# Patient Record
Sex: FEMALE | Race: WHITE | Employment: UNEMPLOYED | URBAN - METROPOLITAN AREA
[De-identification: names, ages, dates, MRNs, and addresses within clinical notes are randomized per-mention and may not be internally consistent; named-entity substitution may affect disease eponyms.]

---

## 2017-05-12 ENCOUNTER — ED HISTORICAL/CONVERTED ENCOUNTER (OUTPATIENT)
Dept: OTHER | Age: 62
End: 2017-05-12

## 2017-05-23 ENCOUNTER — OFFICE VISIT (OUTPATIENT)
Dept: GYNECOLOGY | Age: 62
End: 2017-05-23

## 2017-05-23 VITALS
DIASTOLIC BLOOD PRESSURE: 92 MMHG | WEIGHT: 293 LBS | HEART RATE: 107 BPM | BODY MASS INDEX: 45.99 KG/M2 | HEIGHT: 67 IN | SYSTOLIC BLOOD PRESSURE: 178 MMHG

## 2017-05-23 DIAGNOSIS — N85.00 ENDOMETRIAL HYPERPLASIA: ICD-10-CM

## 2017-05-23 DIAGNOSIS — R19.00 PELVIC MASS: Primary | ICD-10-CM

## 2017-05-23 PROBLEM — E66.01 MORBID OBESITY WITH BMI OF 60.0-69.9, ADULT (HCC): Status: ACTIVE | Noted: 2017-05-23

## 2017-05-23 RX ORDER — METHOCARBAMOL 500 MG/1
TABLET, FILM COATED ORAL
Refills: 0 | COMMUNITY
Start: 2017-05-11 | End: 2017-06-12

## 2017-05-23 RX ORDER — PREGABALIN 75 MG/1
CAPSULE ORAL
Refills: 0 | COMMUNITY
Start: 2017-05-07 | End: 2017-06-12

## 2017-05-23 RX ORDER — ZOSTER VACCINE LIVE 19400 [PFU]/.65ML
INJECTION, POWDER, LYOPHILIZED, FOR SUSPENSION SUBCUTANEOUS
Refills: 0 | COMMUNITY
Start: 2017-05-11 | End: 2017-05-23 | Stop reason: ALTCHOICE

## 2017-05-23 RX ORDER — CYCLOBENZAPRINE HCL 10 MG
TABLET ORAL
Refills: 0 | COMMUNITY
Start: 2017-05-19

## 2017-05-23 NOTE — PROGRESS NOTES
[address] [place] [address] Mathias Mor[address], [address] [facility]  [phone]  MD Elías Jessica MD Natalie Davies, NP    Office Note  Patient ID:  Name:  Conor Richmond  MRN:  [id number]  :  1955/61 y.o. Date:  2017      HISTORY OF PRESENT ILLNESS:  Conor Richmond is a 63 y.o.  postmenopausal female who is being seen for a complex pelvic mass. She is referred by Dr. Bridger Henderson. She also has a history of endometrial hyperplasia and was previously on Megace. She never had any kind of test of cure. She stopped the Megace 2 years ago and has had no bleeding since. She was recently evaluated for a possible kidneys stone. Her CT scan demonstrated a complex, 8 cm, septated mass arising from the left adnexa and displacing the uterus to the right. There was also a large calcified uterine fibroid. There was no free fluid in the pelvis and noadenopathy. Her CA-125 was normal at 21. Her recent pap smear was also normal.  I have been asked to see her in consultation for further evaluation and management. ROS:   and GI review: Negative  Cardiopulmonary review:Negative   Musculoskeletal:  Negative    A comprehensive review of systems was negative except for that written in the History of Present Illness. , 10 point ROS    OB/GYN ROS:  [id number]  Patient denies any abnormal bleeding or vaginal discharge.        Problem List:  Patient Active Problem List    Diagnosis Date Noted    Pelvic mass 2017    Endometrial hyperplasia 2017    Morbid obesity with BMI of 60.0-69.9, adult ([address] 2017     PMH:  Past Medical History:   Diagnosis Date    Diabetes ([address]     diet controlled    Fibromyalgia     GERD (gastroesophageal reflux disease)     Hypercholesteremia     Hypertension     Osteoarthritis of both knees     Plantar fasciitis of left foot       PSH:  Past Surgical History:   Procedure Laterality Date    HX ORTHOPAEDIC      right knee arthroscopy      Social History:  Social History   Substance Use Topics    Smoking status: Never Smoker    Smokeless tobacco: Not on file    Alcohol use No      Family History:  Family History   Problem Relation Age of Onset    Heart Disease Father       Medications: (reviewed)  Current Outpatient Prescriptions   Medication Sig    cyclobenzaprine (FLEXERIL) 10 mg tablet take 1 tablet by mouth every evening    methocarbamol (ROBAXIN) 500 mg tablet take 1 tablet by mouth twice a day    LYRICA 75 mg capsule     traMADol (ULTRAM) 50 mg tablet Take 100 mg by mouth every six (6) hours as needed for Pain.  amLODIPine-Valsartan-HCTZ -25 mg tab Take  by mouth daily.  pravastatin (PRAVACHOL) 40 mg tablet Take 40 mg by mouth nightly.  aspirin 81 mg chewable tablet Take 81 mg by mouth daily.  MULTIVITS W-FE,OTHER MIN/LUT (CENTRUM SILVER ULTRA WOMEN'S PO) Take  by mouth.  cinnamon bark (CINNAMON) 500 mg cap Take 1,000 mg by mouth daily.  omega-3 fatty acids-vitamin e (FISH OIL) 1,000 mg cap Take 2 Caps by mouth daily.  cholecalciferol (VITAMIN D3) 1,000 unit tablet Take 1,000 Units by mouth daily.  DULoxetine (CYMBALTA) 30 mg capsule Take 30 mg by mouth daily.  meloxicam (MOBIC) 7.5 mg tablet Take 7.5 mg by mouth daily.  megestrol (MEGACE) 40 mg tablet Take 40 mg by mouth daily.  esomeprazole (NEXIUM) 40 mg capsule Take 40 mg by mouth. No current facility-administered medications for this visit. Allergies: (reviewed)  Allergies   Allergen Reactions    Bee Sting [Sting, Bee] Swelling          OBJECTIVE:    Physical Exam:  VITAL SIGNS: Vitals:    05/23/17 1022   BP: (!) 178/92   Pulse: (!) 107   Weight: (!) 386 lb 6.4 oz (175.3 kg)   Height: 5' 6.61\" (1.692 m)     Body mass index is 61.22 kg/(m^2). GENERAL TAYA: Conversant, alert, oriented. No acute distress. HEENT: HEENT. No thyroid enlargement. No JVD. Neck: Supple without restrictions. RESPIRATORY: Clear to auscultation and percussion to the bases. No CVAT. CARDIOVASC: RRR without murmur/rub. GASTROINT: soft, non-tender, without masses or organomegaly   MUSCULOSKEL: no joint tenderness, deformity or swelling   EXTREMITIES: extremities normal, atraumatic, no cyanosis or edema   PELVIC: Vulva and vagina appear normal. Bimanual exam reveals normal uterus. Exam limited by body habitus. RECTAL: Deferred   FADIA SURVEY: No suspicious lymphadenopathy or edema noted. NEURO: Grossly intact. No acute deficit. Imaging: Outside CT images reviewed. Pertinent findings noted above in HPI. IMPRESSION/PLAN:  Norm Sorto is a 63 y.o. female with a working diagnosis of complex pelvic mass and history of endometrial hyperplasia. I reviewed with Norm Sorto her medical records, physical exam, and review of symptoms. CT images reviewed and explained to the patient. I have recommended robotic assisted resection of mass, TLH, BSO. We will send the mass and the uterus for frozen section pathology to rule out malignancy. We will proceed with staging as indicated. She is aware that there is a small chance that we would need to convert to laparotomy. She was counseled on the risks, benefits, indications, and alternatives of surgery. Her questions were answered and she wishes to proceed as planned.       Signed By: Marko Mcdonald MD     5/23/2017/10:30 AM

## 2017-05-23 NOTE — LETTER
2017 11:25 AM 
 
Patient:  Brett Ireland YOB: 1955 Date of Visit: 2017 Dear Jesse Navarro MD 
[address] [place] [place] [address] [address] VIA Facsimile: [phone] Starla Zepeda MD 
[address] [facility] [address] VIA Facsimile: [phone] 
 : Thank you for referring Ms. Brett Ireland to me for evaluation/treatment. Below are the relevant portions of my assessment and plan of care. Referred by Dr. Anuj Mitchell for pelvic mass, pt denies abnormal bleeding and pain. [address], [address] [place] [address] [address] 
[phone] MD Stevenson Dasilva MD Jeremiah Loving, PARUL Office Note Patient ID: 
Name:  Brett Ireland MRN:  [id number] :  1955/61 y.o. Date:  2017 HISTORY OF PRESENT ILLNESS: 
Brett Ireland is a 63 y.o.  postmenopausal female who is being seen for a complex pelvic mass. She is referred by Dr. Jesse Navarro. She also has a history of endometrial hyperplasia and was previously on Megace. She never had any kind of test of cure. She stopped the Megace 2 years ago and has had no bleeding since. She was recently evaluated for a possible kidneys stone. Her CT scan demonstrated a complex, 8 cm, septated mass arising from the left adnexa and displacing the uterus to the right. There was also a large calcified uterine fibroid. There was no free fluid in the pelvis and noadenopathy. Her CA-125 was normal at 21. Her recent pap smear was also normal.  I have been asked to see her in consultation for further evaluation and management. ROS: 
 and GI review: Negative Cardiopulmonary review:Negative Musculoskeletal:  Negative A comprehensive review of systems was negative except for that written in the History of Present Illness. , 10 point ROS 
 
OB/GYN ROS: 
[id number] Patient denies any abnormal bleeding or vaginal discharge. Problem List: 
Patient Active Problem List  
 Diagnosis Date Noted  Pelvic mass 05/23/2017  Endometrial hyperplasia 05/23/2017  Morbid obesity with BMI of 60.0-69.9, adult ([facility] 05/23/2017 PMH: 
Past Medical History:  
Diagnosis Date  Diabetes ([facility] diet controlled  Fibromyalgia  GERD (gastroesophageal reflux disease)  Hypercholesteremia  Hypertension  Osteoarthritis of both knees  Plantar fasciitis of left foot PSH: 
Past Surgical History:  
Procedure Laterality Date  HX ORTHOPAEDIC    
 right knee arthroscopy Social History: 
Social History Substance Use Topics  Smoking status: Never Smoker  Smokeless tobacco: Not on file  Alcohol use No  
  
Family History: 
Family History Problem Relation Age of Onset  Heart Disease Father Medications: (reviewed) Current Outpatient Prescriptions Medication Sig  cyclobenzaprine (FLEXERIL) 10 mg tablet take 1 tablet by mouth every evening  methocarbamol (ROBAXIN) 500 mg tablet take 1 tablet by mouth twice a day  LYRICA 75 mg capsule  traMADol (ULTRAM) 50 mg tablet Take 100 mg by mouth every six (6) hours as needed for Pain.  amLODIPine-Valsartan-HCTZ -25 mg tab Take  by mouth daily.  pravastatin (PRAVACHOL) 40 mg tablet Take 40 mg by mouth nightly.  aspirin 81 mg chewable tablet Take 81 mg by mouth daily.  MULTIVITS W-FE,OTHER MIN/LUT (CENTRUM SILVER ULTRA WOMEN'S PO) Take  by mouth.  cinnamon bark (CINNAMON) 500 mg cap Take 1,000 mg by mouth daily.  omega-3 fatty acids-vitamin e (FISH OIL) 1,000 mg cap Take 2 Caps by mouth daily.  cholecalciferol (VITAMIN D3) 1,000 unit tablet Take 1,000 Units by mouth daily.  DULoxetine (CYMBALTA) 30 mg capsule Take 30 mg by mouth daily.  meloxicam (MOBIC) 7.5 mg tablet Take 7.5 mg by mouth daily.  megestrol (MEGACE) 40 mg tablet Take 40 mg by mouth daily.  esomeprazole (NEXIUM) 40 mg capsule Take 40 mg by mouth. No current facility-administered medications for this visit. Allergies: (reviewed) Allergies Allergen Reactions  Bee Sting [Sting, Bee] Swelling OBJECTIVE: 
 
Physical Exam: VITAL SIGNS: Vitals:  
 05/23/17 1022 BP: (!) 178/92 Pulse: (!) 107 Weight: (!) 386 lb 6.4 oz (175.3 kg) Height: 5' 6.61\" (1.692 m) Body mass index is 61.22 kg/(m^2). GENERAL TAYA: Conversant, alert, oriented. No acute distress. HEENT: HEENT. No thyroid enlargement. No JVD. Neck: Supple without restrictions. RESPIRATORY: Clear to auscultation and percussion to the bases. No CVAT. CARDIOVASC: RRR without murmur/rub. GASTROINT: soft, non-tender, without masses or organomegaly MUSCULOSKEL: no joint tenderness, deformity or swelling EXTREMITIES: extremities normal, atraumatic, no cyanosis or edema PELVIC: Vulva and vagina appear normal. Bimanual exam reveals normal uterus. Exam limited by body habitus. RECTAL: Deferred FADIA SURVEY: No suspicious lymphadenopathy or edema noted. NEURO: Grossly intact. No acute deficit. Imaging: Outside CT images reviewed. Pertinent findings noted above in HPI. IMPRESSION/PLAN: 
Rommel Martínez is a 63 y.o. female with a working diagnosis of complex pelvic mass and history of endometrial hyperplasia. I reviewed with Rommel Martínez her medical records, physical exam, and review of symptoms. CT images reviewed and explained to the patient. I have recommended robotic assisted resection of mass, TLH, BSO. We will send the mass and the uterus for frozen section pathology to rule out malignancy. We will proceed with staging as indicated. She is aware that there is a small chance that we would need to convert to laparotomy. She was counseled on the risks, benefits, indications, and alternatives of surgery. Her questions were answered and she wishes to proceed as planned.  
 
 
Signed By: Bobby Sterling MD   
 5/23/2017/10:30 AM  
 
 
 
If you have questions, please do not hesitate to call me. I look forward to following Ms. Chaya along with you.  
 
 
 
Sincerely, 
 
 
Asuncion Renae MD

## 2017-05-23 NOTE — PATIENT INSTRUCTIONS
[employer] Activation    Thank you for requesting access to [employer]. Please follow the instructions below to securely access and download your online medical record. [employer] allows you to send messages to your doctor, view your test results, renew your prescriptions, schedule appointments, and more. How Do I Sign Up? 1. In your internet browser, go to [email or web address]. 2. Click on the First Time User? Click Here link in the Sign In box. You will see the New Member Sign Up page. 3. Enter your [employer] Access Code exactly as it appears below. You will not need to use this code after youve completed the sign-up process. If you do not sign up before the expiration date, you must request a new code. [employer] Access Code: [email or web address]  Expires: 2017 10:00 AM (This is the date your [employer] access code will )    4. Enter the last four digits of your Social Security Number (xxxx) and Date of Birth (mm/dd/yyyy) as indicated and click Submit. You will be taken to the next sign-up page. 5. Create a [employer] ID. This will be your [employer] login ID and cannot be changed, so think of one that is secure and easy to remember. 6. Create a [employer] password. You can change your password at any time. 7. Enter your Password Reset Question and Answer. This can be used at a later time if you forget your password. 8. Enter your e-mail address. You will receive e-mail notification when new information is available in [address]. 9. Click Sign Up. You can now view and download portions of your medical record. 10. Click the Download Summary menu link to download a portable copy of your medical information. Additional Information    If you have questions, please visit the Frequently Asked Questions section of the [employer] website at [email or web address] Remember, [employer] is NOT to be used for urgent needs. For medical emergencies, dial 911.

## 2017-06-12 ENCOUNTER — HOSPITAL ENCOUNTER (OUTPATIENT)
Dept: PREADMISSION TESTING | Age: 62
Discharge: HOME | End: 2017-06-12
Payer: COMMERCIAL

## 2017-06-12 VITALS
HEART RATE: 97 BPM | BODY MASS INDEX: 47.09 KG/M2 | RESPIRATION RATE: 20 BRPM | DIASTOLIC BLOOD PRESSURE: 95 MMHG | SYSTOLIC BLOOD PRESSURE: 173 MMHG | WEIGHT: 293 LBS | TEMPERATURE: 97.7 F | HEIGHT: 66 IN

## 2017-06-12 LAB
ALBUMIN SERPL BCP-MCNC: 3.9 G/DL (ref 3.5–5)
ALBUMIN/GLOB SERPL: 1 {RATIO} (ref 1.1–2.2)
ALP SERPL-CCNC: 134 U/L (ref 45–117)
ALT SERPL-CCNC: 26 U/L (ref 12–78)
ANION GAP BLD CALC-SCNC: 7 MMOL/L (ref 5–15)
AST SERPL W P-5'-P-CCNC: 16 U/L (ref 15–37)
ATRIAL RATE: 87 BPM
BASOPHILS # BLD AUTO: 0 K/UL (ref 0–0.1)
BASOPHILS # BLD: 0 % (ref 0–1)
BILIRUB SERPL-MCNC: 0.5 MG/DL (ref 0.2–1)
BUN SERPL-MCNC: 7 MG/DL (ref 6–20)
BUN/CREAT SERPL: 10 (ref 12–20)
CALCIUM SERPL-MCNC: 9.7 MG/DL (ref 8.5–10.1)
CALCULATED P AXIS, ECG09: 68 DEGREES
CALCULATED R AXIS, ECG10: 12 DEGREES
CALCULATED T AXIS, ECG11: 18 DEGREES
CHLORIDE SERPL-SCNC: 102 MMOL/L (ref 97–108)
CO2 SERPL-SCNC: 30 MMOL/L (ref 21–32)
CREAT SERPL-MCNC: 0.67 MG/DL (ref 0.55–1.02)
DIAGNOSIS, 93000: NORMAL
EOSINOPHIL # BLD: 0.2 K/UL (ref 0–0.4)
EOSINOPHIL NFR BLD: 2 % (ref 0–7)
ERYTHROCYTE [DISTWIDTH] IN BLOOD BY AUTOMATED COUNT: 15.6 % (ref 11.5–14.5)
EST. AVERAGE GLUCOSE BLD GHB EST-MCNC: 134 MG/DL
GLOBULIN SER CALC-MCNC: 3.8 G/DL (ref 2–4)
GLUCOSE SERPL-MCNC: 111 MG/DL (ref 65–100)
HBA1C MFR BLD: 6.3 % (ref 4.2–6.3)
HCT VFR BLD AUTO: 40.9 % (ref 35–47)
HGB BLD-MCNC: 12.9 G/DL (ref 11.5–16)
LYMPHOCYTES # BLD AUTO: 26 % (ref 12–49)
LYMPHOCYTES # BLD: 2.7 K/UL (ref 0.8–3.5)
MCH RBC QN AUTO: 26.5 PG (ref 26–34)
MCHC RBC AUTO-ENTMCNC: 31.5 G/DL (ref 30–36.5)
MCV RBC AUTO: 84 FL (ref 80–99)
MONOCYTES # BLD: 0.6 K/UL (ref 0–1)
MONOCYTES NFR BLD AUTO: 6 % (ref 5–13)
NEUTS SEG # BLD: 7 K/UL (ref 1.8–8)
NEUTS SEG NFR BLD AUTO: 66 % (ref 32–75)
P-R INTERVAL, ECG05: 196 MS
PLATELET # BLD AUTO: 252 K/UL (ref 150–400)
POTASSIUM SERPL-SCNC: 3.9 MMOL/L (ref 3.5–5.1)
PROT SERPL-MCNC: 7.7 G/DL (ref 6.4–8.2)
Q-T INTERVAL, ECG07: 356 MS
QRS DURATION, ECG06: 90 MS
QTC CALCULATION (BEZET), ECG08: 428 MS
RBC # BLD AUTO: 4.87 M/UL (ref 3.8–5.2)
SODIUM SERPL-SCNC: 139 MMOL/L (ref 136–145)
VENTRICULAR RATE, ECG03: 87 BPM
WBC # BLD AUTO: 10.6 K/UL (ref 3.6–11)

## 2017-06-12 RX ORDER — LOSARTAN POTASSIUM 100 MG/1
100 TABLET ORAL DAILY
COMMUNITY

## 2017-06-12 RX ORDER — LANOLIN ALCOHOL/MO/W.PET/CERES
1000 CREAM (GRAM) TOPICAL DAILY
COMMUNITY

## 2017-06-12 RX ORDER — CHOLECALCIFEROL (VITAMIN D3) 125 MCG
1 CAPSULE ORAL DAILY
COMMUNITY

## 2017-06-12 RX ORDER — DICLOFENAC SODIUM 75 MG/1
75 TABLET, DELAYED RELEASE ORAL 2 TIMES DAILY
COMMUNITY

## 2017-06-12 NOTE — PERIOP NOTES
PATIENT GIVEN SURGICAL SITE INFECTION FAQ HANDOUT AND HAND WASHING TIP SHEET. PREOP INSTRUCTIONS REVIEWED AND PATIENT VERBALIZES UNDERSTANDING OF INSTRUCTIONS. PATIENT HAS BEEN GIVEN THE OPPORTUNITY TO ASK QUESTIONS.  CHG WIPES C INSTRUCTIONS GIVEN TO PT.

## 2017-06-13 ENCOUNTER — ANESTHESIA EVENT (OUTPATIENT)
Dept: SURGERY | Age: 62
End: 2017-06-13
Payer: COMMERCIAL

## 2017-06-14 ENCOUNTER — ANESTHESIA (OUTPATIENT)
Dept: SURGERY | Age: 62
End: 2017-06-14
Payer: COMMERCIAL

## 2017-06-14 ENCOUNTER — HOSPITAL ENCOUNTER (OUTPATIENT)
Age: 62
Setting detail: OBSERVATION
Discharge: HOME | End: 2017-06-15
Attending: OBSTETRICS & GYNECOLOGY | Admitting: OBSTETRICS & GYNECOLOGY
Payer: COMMERCIAL

## 2017-06-14 LAB
GLUCOSE BLD STRIP.AUTO-MCNC: 115 MG/DL (ref 65–100)
SERVICE CMNT-IMP: ABNORMAL

## 2017-06-14 PROCEDURE — 74011250636 HC RX REV CODE- 250/636: Performed by: ANESTHESIOLOGY

## 2017-06-14 PROCEDURE — 77030003580 HC NDL INSUF VERES J&J -B: Performed by: OBSTETRICS & GYNECOLOGY

## 2017-06-14 PROCEDURE — 88309 TISSUE EXAM BY PATHOLOGIST: CPT | Performed by: OBSTETRICS & GYNECOLOGY

## 2017-06-14 PROCEDURE — 82962 GLUCOSE BLOOD TEST: CPT

## 2017-06-14 PROCEDURE — 74011000250 HC RX REV CODE- 250

## 2017-06-14 PROCEDURE — 77030016151 HC PROTCTR LNS DFOG COVD -B: Performed by: OBSTETRICS & GYNECOLOGY

## 2017-06-14 PROCEDURE — 74011250637 HC RX REV CODE- 250/637: Performed by: OBSTETRICS & GYNECOLOGY

## 2017-06-14 PROCEDURE — 77030007956 HC PCH ENDOSC SPEC COVD -C: Performed by: OBSTETRICS & GYNECOLOGY

## 2017-06-14 PROCEDURE — 74011000250 HC RX REV CODE- 250: Performed by: OBSTETRICS & GYNECOLOGY

## 2017-06-14 PROCEDURE — 77030019908 HC STETH ESOPH SIMS -A: Performed by: ANESTHESIOLOGY

## 2017-06-14 PROCEDURE — 88307 TISSUE EXAM BY PATHOLOGIST: CPT | Performed by: OBSTETRICS & GYNECOLOGY

## 2017-06-14 PROCEDURE — 74011000258 HC RX REV CODE- 258: Performed by: OBSTETRICS & GYNECOLOGY

## 2017-06-14 PROCEDURE — 77030031492 HC PRT ACC BLNT AIRSEAL CNMD -B: Performed by: OBSTETRICS & GYNECOLOGY

## 2017-06-14 PROCEDURE — 77030033138 HC SUT PGA STRATFX J&J -B: Performed by: OBSTETRICS & GYNECOLOGY

## 2017-06-14 PROCEDURE — 76060000036 HC ANESTHESIA 2.5 TO 3 HR: Performed by: OBSTETRICS & GYNECOLOGY

## 2017-06-14 PROCEDURE — 77030018778 HC MANIP UTER VCAR CNMD -B: Performed by: OBSTETRICS & GYNECOLOGY

## 2017-06-14 PROCEDURE — 77030008684 HC TU ET CUF COVD -B: Performed by: ANESTHESIOLOGY

## 2017-06-14 PROCEDURE — 76210000000 HC OR PH I REC 2 TO 2.5 HR: Performed by: OBSTETRICS & GYNECOLOGY

## 2017-06-14 PROCEDURE — 76010000877 HC OR TIME 2.5 TO 3HR INTENSV - TIER 2: Performed by: OBSTETRICS & GYNECOLOGY

## 2017-06-14 PROCEDURE — 77030032490 HC SLV COMPR SCD KNE COVD -B: Performed by: OBSTETRICS & GYNECOLOGY

## 2017-06-14 PROCEDURE — 88112 CYTOPATH CELL ENHANCE TECH: CPT | Performed by: OBSTETRICS & GYNECOLOGY

## 2017-06-14 PROCEDURE — 77030018836 HC SOL IRR NACL ICUM -A: Performed by: OBSTETRICS & GYNECOLOGY

## 2017-06-14 PROCEDURE — 74011250636 HC RX REV CODE- 250/636

## 2017-06-14 PROCEDURE — 77030013079 HC BLNKT BAIR HGGR 3M -A: Performed by: ANESTHESIOLOGY

## 2017-06-14 PROCEDURE — 77030035488 HC SEAL UNIV DISP INTU -C: Performed by: OBSTETRICS & GYNECOLOGY

## 2017-06-14 PROCEDURE — 88331 PATH CONSLTJ SURG 1 BLK 1SPC: CPT | Performed by: OBSTETRICS & GYNECOLOGY

## 2017-06-14 PROCEDURE — 77030005518 HC CATH URETH FOL 2W BARD -B: Performed by: OBSTETRICS & GYNECOLOGY

## 2017-06-14 PROCEDURE — 77030008771 HC TU NG SALEM SUMP -A: Performed by: ANESTHESIOLOGY

## 2017-06-14 PROCEDURE — 74011250636 HC RX REV CODE- 250/636: Performed by: OBSTETRICS & GYNECOLOGY

## 2017-06-14 PROCEDURE — 77030035277 HC OBTRTR BLDELSS DISP INTU -B: Performed by: OBSTETRICS & GYNECOLOGY

## 2017-06-14 PROCEDURE — 77030011640 HC PAD GRND REM COVD -A: Performed by: OBSTETRICS & GYNECOLOGY

## 2017-06-14 PROCEDURE — 77030002933 HC SUT MCRYL J&J -A: Performed by: OBSTETRICS & GYNECOLOGY

## 2017-06-14 PROCEDURE — 77030008756 HC TU IRR SUC STRY -B: Performed by: OBSTETRICS & GYNECOLOGY

## 2017-06-14 PROCEDURE — 77030010507 HC ADH SKN DERMBND J&J -B: Performed by: OBSTETRICS & GYNECOLOGY

## 2017-06-14 PROCEDURE — 77030026438 HC STYL ET INTUB CARD -A: Performed by: ANESTHESIOLOGY

## 2017-06-14 PROCEDURE — 65210000002 HC RM PRIVATE GYN

## 2017-06-14 PROCEDURE — 99218 HC RM OBSERVATION: CPT

## 2017-06-14 RX ORDER — MIDAZOLAM HYDROCHLORIDE 1 MG/ML
1 INJECTION, SOLUTION INTRAMUSCULAR; INTRAVENOUS AS NEEDED
Status: DISCONTINUED | OUTPATIENT
Start: 2017-06-14 | End: 2017-06-14 | Stop reason: HOSPADM

## 2017-06-14 RX ORDER — ROPIVACAINE HYDROCHLORIDE 5 MG/ML
150 INJECTION, SOLUTION EPIDURAL; INFILTRATION; PERINEURAL AS NEEDED
Status: DISCONTINUED | OUTPATIENT
Start: 2017-06-14 | End: 2017-06-14 | Stop reason: HOSPADM

## 2017-06-14 RX ORDER — SODIUM CHLORIDE 0.9 % (FLUSH) 0.9 %
5-10 SYRINGE (ML) INJECTION AS NEEDED
Status: DISCONTINUED | OUTPATIENT
Start: 2017-06-14 | End: 2017-06-14 | Stop reason: HOSPADM

## 2017-06-14 RX ORDER — ONDANSETRON 2 MG/ML
4 INJECTION INTRAMUSCULAR; INTRAVENOUS
Status: DISCONTINUED | OUTPATIENT
Start: 2017-06-14 | End: 2017-06-15 | Stop reason: HOSPADM

## 2017-06-14 RX ORDER — MORPHINE SULFATE 10 MG/ML
2 INJECTION, SOLUTION INTRAMUSCULAR; INTRAVENOUS
Status: DISCONTINUED | OUTPATIENT
Start: 2017-06-14 | End: 2017-06-14 | Stop reason: HOSPADM

## 2017-06-14 RX ORDER — DICLOFENAC SODIUM 75 MG/1
75 TABLET, DELAYED RELEASE ORAL 2 TIMES DAILY
Status: DISCONTINUED | OUTPATIENT
Start: 2017-06-14 | End: 2017-06-15 | Stop reason: HOSPADM

## 2017-06-14 RX ORDER — SODIUM CHLORIDE 0.9 % (FLUSH) 0.9 %
5-10 SYRINGE (ML) INJECTION AS NEEDED
Status: DISCONTINUED | OUTPATIENT
Start: 2017-06-14 | End: 2017-06-15 | Stop reason: HOSPADM

## 2017-06-14 RX ORDER — SODIUM CHLORIDE 9 MG/ML
125 INJECTION, SOLUTION INTRAVENOUS CONTINUOUS
Status: DISPENSED | OUTPATIENT
Start: 2017-06-14 | End: 2017-06-15

## 2017-06-14 RX ORDER — LORAZEPAM 2 MG/ML
1 INJECTION INTRAMUSCULAR
Status: DISCONTINUED | OUTPATIENT
Start: 2017-06-14 | End: 2017-06-15 | Stop reason: HOSPADM

## 2017-06-14 RX ORDER — PROPOFOL 10 MG/ML
INJECTION, EMULSION INTRAVENOUS AS NEEDED
Status: DISCONTINUED | OUTPATIENT
Start: 2017-06-14 | End: 2017-06-14 | Stop reason: HOSPADM

## 2017-06-14 RX ORDER — FENTANYL CITRATE 50 UG/ML
25 INJECTION, SOLUTION INTRAMUSCULAR; INTRAVENOUS
Status: DISCONTINUED | OUTPATIENT
Start: 2017-06-14 | End: 2017-06-14 | Stop reason: HOSPADM

## 2017-06-14 RX ORDER — SODIUM CHLORIDE, SODIUM LACTATE, POTASSIUM CHLORIDE, CALCIUM CHLORIDE 600; 310; 30; 20 MG/100ML; MG/100ML; MG/100ML; MG/100ML
100 INJECTION, SOLUTION INTRAVENOUS CONTINUOUS
Status: DISCONTINUED | OUTPATIENT
Start: 2017-06-14 | End: 2017-06-14 | Stop reason: HOSPADM

## 2017-06-14 RX ORDER — LIDOCAINE HYDROCHLORIDE 20 MG/ML
INJECTION, SOLUTION EPIDURAL; INFILTRATION; INTRACAUDAL; PERINEURAL AS NEEDED
Status: DISCONTINUED | OUTPATIENT
Start: 2017-06-14 | End: 2017-06-14 | Stop reason: HOSPADM

## 2017-06-14 RX ORDER — OXYCODONE AND ACETAMINOPHEN 5; 325 MG/1; MG/1
1-2 TABLET ORAL
Status: DISCONTINUED | OUTPATIENT
Start: 2017-06-14 | End: 2017-06-15 | Stop reason: HOSPADM

## 2017-06-14 RX ORDER — FENTANYL CITRATE 50 UG/ML
50 INJECTION, SOLUTION INTRAMUSCULAR; INTRAVENOUS AS NEEDED
Status: DISCONTINUED | OUTPATIENT
Start: 2017-06-14 | End: 2017-06-14 | Stop reason: HOSPADM

## 2017-06-14 RX ORDER — FENTANYL CITRATE 50 UG/ML
INJECTION, SOLUTION INTRAMUSCULAR; INTRAVENOUS AS NEEDED
Status: DISCONTINUED | OUTPATIENT
Start: 2017-06-14 | End: 2017-06-14 | Stop reason: HOSPADM

## 2017-06-14 RX ORDER — HYDROMORPHONE HYDROCHLORIDE 1 MG/ML
0.2 INJECTION, SOLUTION INTRAMUSCULAR; INTRAVENOUS; SUBCUTANEOUS
Status: DISCONTINUED | OUTPATIENT
Start: 2017-06-14 | End: 2017-06-14 | Stop reason: HOSPADM

## 2017-06-14 RX ORDER — DIPHENHYDRAMINE HYDROCHLORIDE 50 MG/ML
12.5 INJECTION, SOLUTION INTRAMUSCULAR; INTRAVENOUS AS NEEDED
Status: DISCONTINUED | OUTPATIENT
Start: 2017-06-14 | End: 2017-06-14 | Stop reason: HOSPADM

## 2017-06-14 RX ORDER — LABETALOL HYDROCHLORIDE 5 MG/ML
INJECTION, SOLUTION INTRAVENOUS AS NEEDED
Status: DISCONTINUED | OUTPATIENT
Start: 2017-06-14 | End: 2017-06-14 | Stop reason: HOSPADM

## 2017-06-14 RX ORDER — PHENYLEPHRINE HCL IN 0.9% NACL 0.4MG/10ML
SYRINGE (ML) INTRAVENOUS AS NEEDED
Status: DISCONTINUED | OUTPATIENT
Start: 2017-06-14 | End: 2017-06-14 | Stop reason: HOSPADM

## 2017-06-14 RX ORDER — HYDROMORPHONE HYDROCHLORIDE 2 MG/ML
INJECTION, SOLUTION INTRAMUSCULAR; INTRAVENOUS; SUBCUTANEOUS AS NEEDED
Status: DISCONTINUED | OUTPATIENT
Start: 2017-06-14 | End: 2017-06-14 | Stop reason: HOSPADM

## 2017-06-14 RX ORDER — MIDAZOLAM HYDROCHLORIDE 1 MG/ML
INJECTION, SOLUTION INTRAMUSCULAR; INTRAVENOUS AS NEEDED
Status: DISCONTINUED | OUTPATIENT
Start: 2017-06-14 | End: 2017-06-14 | Stop reason: HOSPADM

## 2017-06-14 RX ORDER — DIPHENHYDRAMINE HYDROCHLORIDE 50 MG/ML
12.5 INJECTION, SOLUTION INTRAMUSCULAR; INTRAVENOUS
Status: DISCONTINUED | OUTPATIENT
Start: 2017-06-14 | End: 2017-06-15 | Stop reason: HOSPADM

## 2017-06-14 RX ORDER — SODIUM CHLORIDE 0.9 % (FLUSH) 0.9 %
5-10 SYRINGE (ML) INJECTION EVERY 8 HOURS
Status: DISCONTINUED | OUTPATIENT
Start: 2017-06-14 | End: 2017-06-15 | Stop reason: HOSPADM

## 2017-06-14 RX ORDER — PANTOPRAZOLE SODIUM 40 MG/1
40 TABLET, DELAYED RELEASE ORAL
Status: DISCONTINUED | OUTPATIENT
Start: 2017-06-14 | End: 2017-06-15 | Stop reason: HOSPADM

## 2017-06-14 RX ORDER — KETOROLAC TROMETHAMINE 30 MG/ML
INJECTION, SOLUTION INTRAMUSCULAR; INTRAVENOUS AS NEEDED
Status: DISCONTINUED | OUTPATIENT
Start: 2017-06-14 | End: 2017-06-14 | Stop reason: HOSPADM

## 2017-06-14 RX ORDER — SUCCINYLCHOLINE CHLORIDE 20 MG/ML
INJECTION INTRAMUSCULAR; INTRAVENOUS AS NEEDED
Status: DISCONTINUED | OUTPATIENT
Start: 2017-06-14 | End: 2017-06-14 | Stop reason: HOSPADM

## 2017-06-14 RX ORDER — SODIUM CHLORIDE 0.9 % (FLUSH) 0.9 %
5-10 SYRINGE (ML) INJECTION EVERY 8 HOURS
Status: DISCONTINUED | OUTPATIENT
Start: 2017-06-14 | End: 2017-06-14 | Stop reason: HOSPADM

## 2017-06-14 RX ORDER — LOSARTAN POTASSIUM 50 MG/1
100 TABLET ORAL DAILY
Status: DISCONTINUED | OUTPATIENT
Start: 2017-06-15 | End: 2017-06-15 | Stop reason: HOSPADM

## 2017-06-14 RX ORDER — KETOROLAC TROMETHAMINE 30 MG/ML
30 INJECTION, SOLUTION INTRAMUSCULAR; INTRAVENOUS
Status: DISCONTINUED | OUTPATIENT
Start: 2017-06-14 | End: 2017-06-15 | Stop reason: HOSPADM

## 2017-06-14 RX ORDER — LIDOCAINE HYDROCHLORIDE 10 MG/ML
0.1 INJECTION, SOLUTION EPIDURAL; INFILTRATION; INTRACAUDAL; PERINEURAL AS NEEDED
Status: DISCONTINUED | OUTPATIENT
Start: 2017-06-14 | End: 2017-06-14 | Stop reason: HOSPADM

## 2017-06-14 RX ORDER — ENOXAPARIN SODIUM 100 MG/ML
40 INJECTION SUBCUTANEOUS EVERY 24 HOURS
Status: DISCONTINUED | OUTPATIENT
Start: 2017-06-14 | End: 2017-06-15 | Stop reason: HOSPADM

## 2017-06-14 RX ORDER — DEXAMETHASONE SODIUM PHOSPHATE 4 MG/ML
INJECTION, SOLUTION INTRA-ARTICULAR; INTRALESIONAL; INTRAMUSCULAR; INTRAVENOUS; SOFT TISSUE AS NEEDED
Status: DISCONTINUED | OUTPATIENT
Start: 2017-06-14 | End: 2017-06-14 | Stop reason: HOSPADM

## 2017-06-14 RX ORDER — ROCURONIUM BROMIDE 10 MG/ML
INJECTION, SOLUTION INTRAVENOUS AS NEEDED
Status: DISCONTINUED | OUTPATIENT
Start: 2017-06-14 | End: 2017-06-14 | Stop reason: HOSPADM

## 2017-06-14 RX ORDER — ONDANSETRON 2 MG/ML
INJECTION INTRAMUSCULAR; INTRAVENOUS AS NEEDED
Status: DISCONTINUED | OUTPATIENT
Start: 2017-06-14 | End: 2017-06-14 | Stop reason: HOSPADM

## 2017-06-14 RX ORDER — MORPHINE SULFATE 10 MG/ML
2 INJECTION, SOLUTION INTRAMUSCULAR; INTRAVENOUS
Status: DISCONTINUED | OUTPATIENT
Start: 2017-06-14 | End: 2017-06-15 | Stop reason: HOSPADM

## 2017-06-14 RX ORDER — MIDAZOLAM HYDROCHLORIDE 1 MG/ML
0.5 INJECTION, SOLUTION INTRAMUSCULAR; INTRAVENOUS
Status: DISCONTINUED | OUTPATIENT
Start: 2017-06-14 | End: 2017-06-14 | Stop reason: HOSPADM

## 2017-06-14 RX ADMIN — SODIUM CHLORIDE, POTASSIUM CHLORIDE, SODIUM LACTATE AND CALCIUM CHLORIDE: 600; 310; 30; 20 INJECTION, SOLUTION INTRAVENOUS at 12:09

## 2017-06-14 RX ADMIN — OXYCODONE HYDROCHLORIDE AND ACETAMINOPHEN 2 TABLET: 5; 325 TABLET ORAL at 22:11

## 2017-06-14 RX ADMIN — MORPHINE SULFATE 2 MG: 10 INJECTION, SOLUTION INTRAVENOUS at 18:17

## 2017-06-14 RX ADMIN — ROCURONIUM BROMIDE 10 MG: 10 INJECTION, SOLUTION INTRAVENOUS at 10:19

## 2017-06-14 RX ADMIN — ROCURONIUM BROMIDE 30 MG: 10 INJECTION, SOLUTION INTRAVENOUS at 09:53

## 2017-06-14 RX ADMIN — FENTANYL CITRATE 50 MCG: 50 INJECTION, SOLUTION INTRAMUSCULAR; INTRAVENOUS at 11:13

## 2017-06-14 RX ADMIN — Medication 80 MCG: at 10:14

## 2017-06-14 RX ADMIN — ONDANSETRON 4 MG: 2 INJECTION INTRAMUSCULAR; INTRAVENOUS at 12:15

## 2017-06-14 RX ADMIN — KETOROLAC TROMETHAMINE 30 MG: 30 INJECTION, SOLUTION INTRAMUSCULAR; INTRAVENOUS at 12:01

## 2017-06-14 RX ADMIN — PANTOPRAZOLE 40 MG: 40 TABLET, DELAYED RELEASE ORAL at 22:00

## 2017-06-14 RX ADMIN — PROPOFOL 150 MG: 10 INJECTION, EMULSION INTRAVENOUS at 09:43

## 2017-06-14 RX ADMIN — LIDOCAINE HYDROCHLORIDE 80 MG: 20 INJECTION, SOLUTION EPIDURAL; INFILTRATION; INTRACAUDAL; PERINEURAL at 09:43

## 2017-06-14 RX ADMIN — CEFOTETAN DISODIUM 2 G: 2 INJECTION, POWDER, FOR SOLUTION INTRAMUSCULAR; INTRAVENOUS at 09:57

## 2017-06-14 RX ADMIN — SODIUM CHLORIDE, POTASSIUM CHLORIDE, SODIUM LACTATE AND CALCIUM CHLORIDE: 600; 310; 30; 20 INJECTION, SOLUTION INTRAVENOUS at 09:34

## 2017-06-14 RX ADMIN — HYDROMORPHONE HYDROCHLORIDE 0.5 MG: 2 INJECTION, SOLUTION INTRAMUSCULAR; INTRAVENOUS; SUBCUTANEOUS at 10:32

## 2017-06-14 RX ADMIN — ENOXAPARIN SODIUM 40 MG: 40 INJECTION SUBCUTANEOUS at 16:06

## 2017-06-14 RX ADMIN — FENTANYL CITRATE 25 MCG: 50 INJECTION, SOLUTION INTRAMUSCULAR; INTRAVENOUS at 13:06

## 2017-06-14 RX ADMIN — Medication 80 MCG: at 10:09

## 2017-06-14 RX ADMIN — MIDAZOLAM HYDROCHLORIDE 2 MG: 1 INJECTION, SOLUTION INTRAMUSCULAR; INTRAVENOUS at 09:34

## 2017-06-14 RX ADMIN — HYDROMORPHONE HYDROCHLORIDE 0.5 MG: 2 INJECTION, SOLUTION INTRAMUSCULAR; INTRAVENOUS; SUBCUTANEOUS at 10:41

## 2017-06-14 RX ADMIN — FENTANYL CITRATE 50 MCG: 50 INJECTION, SOLUTION INTRAMUSCULAR; INTRAVENOUS at 09:43

## 2017-06-14 RX ADMIN — HYDROMORPHONE HYDROCHLORIDE 0.5 MG: 2 INJECTION, SOLUTION INTRAMUSCULAR; INTRAVENOUS; SUBCUTANEOUS at 10:48

## 2017-06-14 RX ADMIN — SODIUM CHLORIDE 125 ML/HR: 900 INJECTION, SOLUTION INTRAVENOUS at 13:55

## 2017-06-14 RX ADMIN — ROCURONIUM BROMIDE 10 MG: 10 INJECTION, SOLUTION INTRAVENOUS at 11:33

## 2017-06-14 RX ADMIN — LABETALOL HYDROCHLORIDE 10 MG: 5 INJECTION, SOLUTION INTRAVENOUS at 11:39

## 2017-06-14 RX ADMIN — DEXAMETHASONE SODIUM PHOSPHATE 4 MG: 4 INJECTION, SOLUTION INTRA-ARTICULAR; INTRALESIONAL; INTRAMUSCULAR; INTRAVENOUS; SOFT TISSUE at 10:01

## 2017-06-14 RX ADMIN — ROCURONIUM BROMIDE 20 MG: 10 INJECTION, SOLUTION INTRAVENOUS at 10:52

## 2017-06-14 RX ADMIN — SUCCINYLCHOLINE CHLORIDE 160 MG: 20 INJECTION INTRAMUSCULAR; INTRAVENOUS at 09:43

## 2017-06-14 RX ADMIN — FENTANYL CITRATE 25 MCG: 50 INJECTION, SOLUTION INTRAMUSCULAR; INTRAVENOUS at 12:57

## 2017-06-14 RX ADMIN — CEFAZOLIN 3 G: 1 INJECTION, POWDER, FOR SOLUTION INTRAMUSCULAR; INTRAVENOUS; PARENTERAL at 16:27

## 2017-06-14 RX ADMIN — FENTANYL CITRATE 50 MCG: 50 INJECTION, SOLUTION INTRAMUSCULAR; INTRAVENOUS at 09:56

## 2017-06-14 RX ADMIN — ROCURONIUM BROMIDE 10 MG: 10 INJECTION, SOLUTION INTRAVENOUS at 09:43

## 2017-06-14 RX ADMIN — FENTANYL CITRATE 25 MCG: 50 INJECTION, SOLUTION INTRAMUSCULAR; INTRAVENOUS at 13:44

## 2017-06-14 RX ADMIN — FENTANYL CITRATE 50 MCG: 50 INJECTION, SOLUTION INTRAMUSCULAR; INTRAVENOUS at 11:27

## 2017-06-14 RX ADMIN — KETOROLAC TROMETHAMINE 30 MG: 30 INJECTION, SOLUTION INTRAMUSCULAR at 07:00

## 2017-06-14 RX ADMIN — LORAZEPAM 1 MG: 2 INJECTION INTRAMUSCULAR; INTRAVENOUS at 23:22

## 2017-06-14 RX ADMIN — HYDROMORPHONE HYDROCHLORIDE 0.5 MG: 2 INJECTION, SOLUTION INTRAMUSCULAR; INTRAVENOUS; SUBCUTANEOUS at 10:53

## 2017-06-14 NOTE — PERIOP NOTES
Patient interviewed in holding area, identity and procedure verified. 1000 ml 0.9% NaCl as irrigation. Family member updated as to start of surgery.

## 2017-06-14 NOTE — IP AVS SNAPSHOT
Current Discharge Medication List  
  
START taking these medications Dose & Instructions Dispensing Information Comments Morning Noon Evening Bedtime  
 enoxaparin 40 mg/0.4 mL Commonly known as:  LOVENOX Your last dose was: Your next dose is:    
   
   
 Dose:  40 mg  
0.4 mL by SubCUTAneous route every twenty-four (24) hours. Quantity:  14 Syringe Refills:  0  
     
   
   
   
  
 oxyCODONE-acetaminophen 5-325 mg per tablet Commonly known as:  PERCOCET Your last dose was: Your next dose is:    
   
   
 Dose:  1-2 Tab Take 1-2 Tabs by mouth every four (4) hours as needed. Max Daily Amount: 12 Tabs. Quantity:  30 Tab Refills:  0 CONTINUE these medications which have NOT CHANGED Dose & Instructions Dispensing Information Comments Morning Noon Evening Bedtime AMLODIPINE BESYLATE (BULK) Your last dose was: Your next dose is:    
   
   
 Dose:  10 mg  
10 mg by Does Not Apply route every morning. Refills:  0  
     
   
   
   
  
 aspirin 81 mg chewable tablet Your last dose was: Your next dose is:    
   
   
 Dose:  81 mg Take 81 mg by mouth daily. Refills:  0 CENTRUM SILVER ULTRA WOMEN'S PO Your last dose was: Your next dose is: Take  by mouth. Refills:  0 CINNAMON 500 mg Cap Generic drug:  cinnamon bark Your last dose was: Your next dose is:    
   
   
 Dose:  1000 mg Take 1,000 mg by mouth daily. Refills:  0  
     
   
   
   
  
 cyclobenzaprine 10 mg tablet Commonly known as:  FLEXERIL Your last dose was: Your next dose is:    
   
   
 take 1 tablet by mouth every evening Refills:  0  
     
   
   
   
  
 diclofenac EC 75 mg EC tablet Commonly known as:  VOLTAREN Your last dose was: Your next dose is:    
   
   
 Dose:  75 mg Take 75 mg by mouth two (2) times a day. Refills:  0  
     
   
   
   
  
 FISH OIL PO Your last dose was: Your next dose is:    
   
   
 Dose:  1200 mg Take 1,200 mg by mouth two (2) times a day. Refills:  0  
     
   
   
   
  
 losartan 100 mg tablet Commonly known as:  COZAAR Your last dose was: Your next dose is:    
   
   
 Dose:  100 mg Take 100 mg by mouth daily. Refills:  0  
     
   
   
   
  
 meloxicam 7.5 mg tablet Commonly known as:  MOBIC Your last dose was: Your next dose is:    
   
   
 Dose:  7.5 mg Take 7.5 mg by mouth daily. Refills:  0 NexIUM 40 mg capsule Generic drug:  esomeprazole Your last dose was: Your next dose is:    
   
   
 Dose:  40 mg Take 40 mg by mouth nightly. Refills:  0  
     
   
   
   
  
 OSTEO BI-FLEX PO Your last dose was: Your next dose is:    
   
   
 Dose:  2 Tab Take 2 Tabs by mouth daily. Refills:  0  
     
   
   
   
  
 pravastatin 40 mg tablet Commonly known as:  PRAVACHOL Your last dose was: Your next dose is:    
   
   
 Dose:  40 mg Take 40 mg by mouth nightly. Refills:  0  
     
   
   
   
  
 traMADol 50 mg tablet Commonly known as:  ULTRAM  
   
Your last dose was: Your next dose is:    
   
   
 Dose:  100 mg Take 100 mg by mouth two (2) times a day. Refills:  0  
     
   
   
   
  
 VITAMIN B-12 1,000 mcg tablet Generic drug:  cyanocobalamin Your last dose was: Your next dose is:    
   
   
 Dose:  1000 mcg Take 1,000 mcg by mouth daily. Refills:  0  
     
   
   
   
  
 VITAMIN D3 2,000 unit Tab Generic drug:  cholecalciferol (vitamin D3) Your last dose was: Your next dose is:    
   
   
 Dose:  1 Tab Take 1 Tab by mouth daily. Refills:  0 Where to Get Your Medications Information on where to get these meds will be given to you by the nurse or doctor. ! Ask your nurse or doctor about these medications  
  enoxaparin 40 mg/0.4 mL  
 oxyCODONE-acetaminophen 5-325 mg per tablet

## 2017-06-14 NOTE — PROGRESS NOTES
Bedside and Verbal shift change report given to Nathaniel Chavez (oncoming nurse) by Toi Kenny RN (offgoing nurse). Report included the following information SBAR, Intake/Output and MAR.

## 2017-06-14 NOTE — OP NOTES
Gynecologic Oncology Operative Report    Tonia Gibson  6/14/2017    Pre-operative dx:  Pelvic mass, endometrial hyperplasia, morbid obesity (BMI >60)     Post-operative dx:  Pelvic mass, endometrial hyperplasia, morbid obesity (BMI >60)     Procedure:  1) Robotic-assisted total laparoscopic hysterectomy     2) Bilateral salpingo-oophorectomy     3) Laparoscopic resection of mass (5-10 cm)      Surgeon:  Karely Alex MD     Assistant:  Noé Calle SA     Anesthesia:  GETA     EBL:  <40 cc     Complications:  None     Specimens:  uterus, cervix, bilateral fallopian tubes and ovaries, pelvic washings, bilateral pelvic lymph nodes     Operative indications:  62 yo morbidly obese WF with hx of endometrial hyperplasia and now with an 8 cm septated left adnexal mass. Operative findings:  Mildly enlarged fibroid uterus. Large cystic left ovarian mass with adhesions to the posterior lower uterine segment and cervix. Normal right tube and ovary. On frozen section pathology the mass appeared benign. The uterus had some hyperplastic polyps with atypia and possibly an early grade 1 cancer. She did not tolerate steep Trendelenburg position very well per anesthesia. Procedure in detail: After the risks, benefits, indications, and alternatives of the procedure were discussed with the patient and informed consent was obtained, the patient was taken to the operating room. She was positively identified, administered general anesthesia, and then placed in the dorsal lithotomy position in [address] [place] [address]. An exam under anesthesia was performed. She was then prepped and draped in the usual fashion. A silveira catheter was inserted, followed by a V-Care uterine manipulator. We then proceeded with laparoscopy. A horizontal incision was made in the midline above the level of the umbilicus.   A Veres needle was inserted into the peritoneal cavity and the placement was confirmed  The abdomen was then insufflated to a pressure of 15 mm Hg. A 8 mm da Mary trocar was then inserted at this site. The camera was then inserted to confirm proper placement. Three additional 8 mm da Mary trocars were then placed under direct visualization, two on the right, and one on the left. These were placed approximately 8 cm apart in an arcing pattern. An 8 mm Airseal assistant port was then placed in the left abdomen, 8 cm from the lateral da Mary trocar. Positioning of the trocars in the abdominal wall were then adjusted to locate the point of articulation at the level of the fascia. The patient was then placed in steep Trendelenberg position. The camera port was then docked and the camera was inserted into the #2 port and advanced. The targeting mechanism was then used to allow for better localization of the other three arms. These trocars were then docked with the [employer]. A fenestrated bipolar grasper was placed in arm #1, monopolar scissors placed in arm #3, and a Prograsp placed in arm #4. I then removed my gown and made my way to the console. The abdomen and pelvis were then examined, with the above mentioned findings noted. Pelvic washings were obtained as well. We then proceeded with the hysterectomy. The left round ligament was identified, then coagulated and transected with the cautery, allowing entry into the retroperitoneal space. The vesico-uterine peritoneum was divided with cauteryl from the left side across the midline, allowing visualization of the ring of the V-Care manipulator anteriorly. We then identified the left ureter and bluntly developed the perirectal space. The left uterine artery was identified at its origin off of the hypogastric artery, and it was coagulated with bipolar cautery at that point, with the ureter being held on traction medially to ensure its safety. The left ovarian vessels were then isolated and then coagulated and transected with cautery.  The posterior leaf of the broad ligament was then divided with the cautery up to the level of the uterine body. There were adhesions of the mass to the posterior uterus. The left fallopian tube and proper ovarian ligament were divided at the uterine cornua using cautery. The remaining adhesions were then freed along the posterior uterus until the mass was completely free. It was placed in the upper abdomen for later removal.  We then directed our attention to the right side of the pelvis. The right round ligament was identified and then coagulated and transected with cautery, allowing entry into the retroperitoneal space. The remaining vesico-uterine peritoneum was then divided with on the right side. The right ureter was then identified and the perirectal space was bluntly developed. The right uterine artery was then identified at its origin off of the hypogastric artery. It was coagulated with bipolar cautery at that point, with the ureter being held on traction medially to ensure its safety. The right ovarian vessels were then isolated and then coagulated and transected with cautery. The posterior leaf of the broad ligament was then divided with the Harmonic Scalpel up to the level of the uterine body. We then moved anteriorly and the bladder was sharply dissected off of the lower uterine segment and cervix until it was well below the ring of the [employer]. The uterine arteries were then skeletonized bilaterally and then coagulated with bipolar cautery and transected with monopolar cautery at the level of the V-Care ring. A circumferential colpotomy was then performed by using the monopolar scissors to cut down onto the V-Care ring. Once the colpotomy was completed, the specimen was delivered transvaginally and sent to pathology. In order to remove the mass, it was placed into a 15 mm Endocatch bag that was inserted transvaginally.   The mass was then pulled down to the vaginal opening and ruptured within the bag to allow for transvaginal removal. The uterus was then placed transvaginally as well. A bulb syringe was then placed into the vagina to maintain pneumoperitoneum for vaginal cuff closure. The scissors were then removed from arm #3 and replaced with a [employer] needle . The cuff was then reapproximated with a running closure using a 2-0 PDS V-lock suture. Excellent reapproximation and hemostasis was noted. The pelvis was then irrigated and all pedicles inspected. Once satisfied with hemostasis, the gas was then allowed to escape from the abdomen and the trochars were removed. She was then taken out of Trendelenburg tilt. The incision sites were closed with a stitch of 4-0 Monocryl, followed by a layer of Dermabond. The bulb syringe was then removed from the vagina. The patient was taken out of stirrups, awakened from anesthesia, and taken to the recovery room in stable condition. All instrument, sponge, and needle counts were correct.      Ganga Min MD  6/14/2017  11:57 AM

## 2017-06-14 NOTE — PROGRESS NOTES
Care Management Interventions  Mode of Transport at Discharge: Other (see comment) (private vehicle)  Discharge Durable Medical Equipment: No  Physical Therapy Consult: No  Occupational Therapy Consult: No  Speech Therapy Consult: No  Current Support Network: Lives with Spouse (Independent with ADLs)  Confirm Follow Up Transport: Family  Freedom of Choice Offered: Yes  Discharge Location  Discharge Placement: Home    CM reviewed chart. Pt is independent with ADLs and IADLs. Pt lives with her  in a 1-level private residence. Pt does not use any assistive devices. Plan is to return home at time of discharge, family will transport. Pt gets her prescriptions filled at her local [facility]. Cm will continue to be available incase any needs arise.   NADJA Wade, ACM

## 2017-06-14 NOTE — PROGRESS NOTES
TRANSFER - IN REPORT:    Verbal report received from [facility]) on Brett Ireland  being received from PACU(unit) for routine post - op      Report consisted of patients Situation, Background, Assessment and   Recommendations(SBAR). Information from the following report(s) SBAR, Intake/Output and MAR was reviewed with the receiving nurse. Opportunity for questions and clarification was provided. Assessment completed upon patients arrival to unit and care assumed.

## 2017-06-14 NOTE — ANESTHESIA PREPROCEDURE EVALUATION
Anesthetic History   No history of anesthetic complications            Review of Systems / Medical History  Patient summary reviewed, nursing notes reviewed and pertinent labs reviewed    Pulmonary  Within defined limits        Undiagnosed apnea         Neuro/Psych   Within defined limits           Cardiovascular    Hypertension                   GI/Hepatic/Renal     GERD           Endo/Other    Diabetes    Morbid obesity and arthritis     Other Findings              Physical Exam    Airway  Mallampati: II  TM Distance: > 6 cm  Neck ROM: normal range of motion   Mouth opening: Normal     Cardiovascular  Regular rate and rhythm,  S1 and S2 normal,  no murmur, click, rub, or gallop             Dental  No notable dental hx       Pulmonary  Breath sounds clear to auscultation               Abdominal  GI exam deferred       Other Findings            Anesthetic Plan    ASA: 3  Anesthesia type: general          Induction: Intravenous  Anesthetic plan and risks discussed with: Patient

## 2017-06-14 NOTE — H&P
[address] [place] [address] [address], [address] [facility]  [phone]  MD Nathan Munguia MD Reynaldo Bradford, NP        Patient ID:  Name:  Eric Cazares  MRN:  [id number]  :  1955/61 y.o. Date:  2017       HISTORY OF PRESENT ILLNESS:  Eric Cazares is a 63 y.o.  postmenopausal female who is being seen for a complex pelvic mass. She is referred by Dr. Jose Hanley. She also has a history of endometrial hyperplasia and was previously on Megace. She never had any kind of test of cure. She stopped the Megace 2 years ago and has had no bleeding since. She was recently evaluated for a possible kidneys stone. Her CT scan demonstrated a complex, 8 cm, septated mass arising from the left adnexa and displacing the uterus to the right. There was also a large calcified uterine fibroid. There was no free fluid in the pelvis and noadenopathy. Her CA-125 was normal at 21. Her recent pap smear was also normal. I have been asked to see her in consultation for further evaluation and management.         ROS:   and GI review: Negative  Cardiopulmonary review:Negative   Musculoskeletal:  Negative     A comprehensive review of systems was negative except for that written in the History of Present Illness. , 10 point ROS     OB/GYN ROS:  [id number]  Patient denies any abnormal bleeding or vaginal discharge.       Problem List:  Patient Active Problem List    Diagnosis Date Noted    Pelvic mass 2017    Endometrial hyperplasia 2017    Morbid obesity with BMI of 60.0-69.9, adult ([address] 2017     PMH:  Past Medical History:   Diagnosis Date    Arthritis     Autoimmune disease ([address]     FIBROMYALGIA     Chronic pain     Diabetes ([facility]     diet controlled    Fibromyalgia     GERD (gastroesophageal reflux disease)     Hypercholesteremia     Hypertension     Ill-defined condition     LYMPHEDEMA    Ill-defined condition     NEUROPATHY BOTH FEET    Ill-defined condition     DEGENERATIVE DISC DISEASE- SPURS    Osteoarthritis of both knees     Plantar fasciitis of left foot       PSH:  Past Surgical History:   Procedure Laterality Date    HX DILATION AND CURETTAGE  2015    HX GI      COLONOSCOPY    HX ORTHOPAEDIC      right knee arthroscopy    HX ORTHOPAEDIC      L FOOT SURGERY    HX TUBAL LIGATION  1997      Social History:  Social History   Substance Use Topics    Smoking status: Never Smoker    Smokeless tobacco: Never Used    Alcohol use No      Family History:  Family History   Problem Relation Age of Onset    Heart Disease Father     Emphysema Father     Thyroid Disease Mother     Bleeding Prob Mother      CLOTS    Arthritis-osteo Sister     Bleeding Prob Sister      CLOTS    Arthritis-osteo Brother     Bleeding Prob Brother      CLOTS    Kidney Disease Sister      CANCER    Arthritis-osteo Brother     Cancer Brother      PROSTATE    Other Brother      AA      Medications: (reviewed)  Current Facility-Administered Medications   Medication Dose Route Frequency    lactated ringers infusion  100 mL/hr IntraVENous CONTINUOUS    sodium chloride (NS) flush 5-10 mL  5-10 mL IntraVENous Q8H    sodium chloride (NS) flush 5-10 mL  5-10 mL IntraVENous PRN    lidocaine (PF) (XYLOCAINE) 10 mg/mL (1 %) injection 0.1 mL  0.1 mL SubCUTAneous PRN    fentaNYL citrate (PF) injection 50 mcg  50 mcg IntraVENous PRN    midazolam (VERSED) injection 1 mg  1 mg IntraVENous PRN    midazolam (VERSED) injection 1 mg  1 mg IntraVENous PRN    ropivacaine (PF) (NAROPIN) 5 mg/mL (0.5 %) injection 150 mg  150 mg Peripheral Nerve Block PRN    cefoTEtan (CEFOTAN) 2 g in 0.9% sodium chloride (MBP/ADV) 50 mL  2 g IntraVENous ON CALL TO OR     Allergies: (reviewed)  Allergies   Allergen Reactions    Bee Sting [Sting, Bee] Swelling          OBJECTIVE:    Physical Exam:  VITAL SIGNS: Vitals:    06/14/17 0852   BP: 141/71   Pulse: (!) 102   Resp: 16 Temp: 98.2 °F (36.8 °C)   SpO2: 95%   Weight: (!) 170.1 kg (375 lb)   Height: 5' 6\" (1.676 m)     Body mass index is 60.53 kg/(m^2). GENERAL TAYA: Conversant, alert, oriented. No acute distress. HEENT: HEENT. No thyroid enlargement. No JVD. Neck: Supple without restrictions. RESPIRATORY: Clear to auscultation and percussion to the bases. No CVAT. CARDIOVASC: RRR without murmur/rub. GASTROINT: soft, non-tender, without masses or organomegaly   MUSCULOSKEL: no joint tenderness, deformity or swelling       EXTREMITIES: extremities normal, atraumatic, no cyanosis or edema   PELVIC: Vulva and vagina appear normal. Bimanual exam reveals normal uterus. Exam limited by body habitus. RECTAL: Deferred   FADIA SURVEY: No suspicious lymphadenopathy or edema noted. NEURO: Grossly intact. No acute deficit. Lab Results   Component Value Date/Time    WBC 10.6 06/12/2017 09:23 AM    HGB 12.9 06/12/2017 09:23 AM    HCT 40.9 06/12/2017 09:23 AM    PLATELET 199 97/27/7522 09:23 AM    MCV 84.0 06/12/2017 09:23 AM     Lab Results   Component Value Date/Time    Sodium 139 06/12/2017 09:23 AM    Potassium 3.9 06/12/2017 09:23 AM    Chloride 102 06/12/2017 09:23 AM    CO2 30 06/12/2017 09:23 AM    Anion gap 7 06/12/2017 09:23 AM    Glucose 111 06/12/2017 09:23 AM    BUN 7 06/12/2017 09:23 AM    Creatinine 0.67 06/12/2017 09:23 AM    BUN/Creatinine ratio 10 06/12/2017 09:23 AM    GFR est AA >60 06/12/2017 09:23 AM    GFR est non-AA >60 06/12/2017 09:23 AM    Calcium 9.7 06/12/2017 09:23 AM       Imaging: Outside CT images reviewed. Pertinent findings noted above in HPI.        IMPRESSION/PLAN:  Conor Richmond is a 63 y.o. female with a working diagnosis of complex pelvic mass and history of endometrial hyperplasia. I reviewed with Conor Richmond her medical records, physical exam, and review of symptoms. CT images reviewed and explained to the patient. I have recommended robotic assisted resection of mass, TLH, BSO.  We will send the mass and the uterus for frozen section pathology to rule out malignancy. We will proceed with staging as indicated. She is aware that there is a small chance that we would need to convert to laparotomy. She was counseled on the risks, benefits, indications, and alternatives of surgery. Her questions were answered and she wishes to proceed as planned.       Signed By: Eduarda Boland., MD     6/14/2017/7:09 AM

## 2017-06-14 NOTE — ANESTHESIA POSTPROCEDURE EVALUATION
Post-Anesthesia Evaluation and Assessment    Patient: Taylor Guillen MRN: [id number]  SSN: [id number]    YOB: 1955  Age: 63 y.o. Sex: female       Cardiovascular Function/Vital Signs  Visit Vitals    /71    Pulse 85    Temp 36.4 °C (97.6 °F)    Resp 10    Ht 5' 6\" (1.676 m)    Wt (!) 170.1 kg (375 lb)    SpO2 96%    BMI 60.53 kg/m2       Patient is status post general anesthesia for Procedure(s):  ROBOTIC ASSISTED RESECTION OF MASS, TOTAL LAPAROSCOPIC HYSTERECTOMY, BILATERAL SALPHINGO OOPHORECTOMY, . Nausea/Vomiting: None    Postoperative hydration reviewed and adequate. Pain:  Pain Scale 1: Numeric (0 - 10) (06/14/17 1228)  Pain Intensity 1: 0 (06/14/17 1228)   Managed    Neurological Status:   Neuro (WDL): Within Defined Limits (06/14/17 1228)   At baseline    Mental Status and Level of Consciousness: Arousable    Pulmonary Status:   O2 Device: Nasal cannula (06/14/17 1228)   Adequate oxygenation and airway patent    Complications related to anesthesia: None    Post-anesthesia assessment completed.  No concerns    Signed By: Catherine Rucker MD     June 14, 2017

## 2017-06-14 NOTE — IP AVS SNAPSHOT
[address] 
[phone] Patient: Trinity Gutierrez MRN: [id number] MDQ:6/85/9271 You are allergic to the following Allergen Reactions Bee Sting (Sting, Bee) Swelling Recent Documentation Height Weight BMI OB Status Smoking Status 1.676 m (!) 170.1 kg 60.53 kg/m2 Postmenopausal Never Smoker Emergency Contacts Name Discharge Info Relation Home Work Mobile Garrett Larkin  Spouse [3] [phone] About your hospitalization You were admitted on:  June 14, 2017 You last received care in the:  [facility] [address] You were discharged on:  Brooke 15, 2017 Unit phone number:  [phone] Why you were hospitalized Your primary diagnosis was:  Not on File Your diagnoses also included:  Pelvic Mass, Intramural Leiomyoma Of Uterus Providers Seen During Your Hospitalizations Provider Role Specialty Primary office phone Chema Devries MD Attending Provider Gynecologic Oncology [phone] Your Primary Care Physician (PCP) Primary Care Physician Office Phone Office Fax [facility] [phone] Follow-up Information Follow up With Details Comments Contact Info Mariposa Lozoya MD   [address] [place] [address] 
[phone] Chema Devries MD In 1 month Call office today or tomorrow to make an appointment to see him in 1 month [address] [address] [address] [place] [address] 
[phone] Current Discharge Medication List  
  
START taking these medications Dose & Instructions Dispensing Information Comments Morning Noon Evening Bedtime  
 enoxaparin 40 mg/0.4 mL Commonly known as:  LOVENOX Your last dose was: Your next dose is:    
   
   
 Dose:  40 mg  
0.4 mL by SubCUTAneous route every twenty-four (24) hours.   
 Quantity:  14 Syringe Refills:  0  
     
   
   
   
  
 oxyCODONE-acetaminophen 5-325 mg per tablet Commonly known as:  PERCOCET Your last dose was: Your next dose is:    
   
   
 Dose:  1-2 Tab Take 1-2 Tabs by mouth every four (4) hours as needed. Max Daily Amount: 12 Tabs. Quantity:  30 Tab Refills:  0 CONTINUE these medications which have NOT CHANGED Dose & Instructions Dispensing Information Comments Morning Noon Evening Bedtime AMLODIPINE BESYLATE (BULK) Your last dose was: Your next dose is:    
   
   
 Dose:  10 mg  
10 mg by Does Not Apply route every morning. Refills:  0  
     
   
   
   
  
 aspirin 81 mg chewable tablet Your last dose was: Your next dose is:    
   
   
 Dose:  81 mg Take 81 mg by mouth daily. Refills:  0 CENTRUM SILVER ULTRA WOMEN'S PO Your last dose was: Your next dose is: Take  by mouth. Refills:  0 CINNAMON 500 mg Cap Generic drug:  cinnamon bark Your last dose was: Your next dose is:    
   
   
 Dose:  1000 mg Take 1,000 mg by mouth daily. Refills:  0  
     
   
   
   
  
 cyclobenzaprine 10 mg tablet Commonly known as:  FLEXERIL Your last dose was: Your next dose is:    
   
   
 take 1 tablet by mouth every evening Refills:  0  
     
   
   
   
  
 diclofenac EC 75 mg EC tablet Commonly known as:  VOLTAREN Your last dose was: Your next dose is:    
   
   
 Dose:  75 mg Take 75 mg by mouth two (2) times a day. Refills:  0  
     
   
   
   
  
 FISH OIL PO Your last dose was: Your next dose is:    
   
   
 Dose:  1200 mg Take 1,200 mg by mouth two (2) times a day. Refills:  0  
     
   
   
   
  
 losartan 100 mg tablet Commonly known as:  COZAAR Your last dose was: Your next dose is:    
   
   
 Dose:  100 mg Take 100 mg by mouth daily. Refills:  0  
     
   
   
   
  
 meloxicam 7.5 mg tablet Commonly known as:  MOBIC Your last dose was: Your next dose is:    
   
   
 Dose:  7.5 mg Take 7.5 mg by mouth daily. Refills:  0 NexIUM 40 mg capsule Generic drug:  esomeprazole Your last dose was: Your next dose is:    
   
   
 Dose:  40 mg Take 40 mg by mouth nightly. Refills:  0  
     
   
   
   
  
 OSTEO BI-FLEX PO Your last dose was: Your next dose is:    
   
   
 Dose:  2 Tab Take 2 Tabs by mouth daily. Refills:  0  
     
   
   
   
  
 pravastatin 40 mg tablet Commonly known as:  PRAVACHOL Your last dose was: Your next dose is:    
   
   
 Dose:  40 mg Take 40 mg by mouth nightly. Refills:  0  
     
   
   
   
  
 traMADol 50 mg tablet Commonly known as:  ULTRAM  
   
Your last dose was: Your next dose is:    
   
   
 Dose:  100 mg Take 100 mg by mouth two (2) times a day. Refills:  0  
     
   
   
   
  
 VITAMIN B-12 1,000 mcg tablet Generic drug:  cyanocobalamin Your last dose was: Your next dose is:    
   
   
 Dose:  1000 mcg Take 1,000 mcg by mouth daily. Refills:  0  
     
   
   
   
  
 VITAMIN D3 2,000 unit Tab Generic drug:  cholecalciferol (vitamin D3) Your last dose was: Your next dose is:    
   
   
 Dose:  1 Tab Take 1 Tab by mouth daily. Refills:  0 Where to Get Your Medications Information on where to get these meds will be given to you by the nurse or doctor. ! Ask your nurse or doctor about these medications  
  enoxaparin 40 mg/0.4 mL  
 oxyCODONE-acetaminophen 5-325 mg per tablet Discharge Instructions [address] [place] [address] MD Suzy Lee MD Griffin Redbird, NP Patient Discharge Instructions  Katie Bell / [id number] : 1955 Admitted 6/14/2017 Discharged: 6/15/2017 Take Home Medications No current facility-administered medications on file prior to encounter. Current Outpatient Prescriptions on File Prior to Encounter Medication Sig Dispense Refill  cyclobenzaprine (FLEXERIL) 10 mg tablet take 1 tablet by mouth every evening  0  
 traMADol (ULTRAM) 50 mg tablet Take 100 mg by mouth two (2) times a day.  meloxicam (MOBIC) 7.5 mg tablet Take 7.5 mg by mouth daily.  pravastatin (PRAVACHOL) 40 mg tablet Take 40 mg by mouth nightly.  MULTIVITS W-FE,OTHER MIN/LUT (CENTRUM SILVER ULTRA WOMEN'S PO) Take  by mouth.  cinnamon bark (CINNAMON) 500 mg cap Take 1,000 mg by mouth daily.  aspirin 81 mg chewable tablet Take 81 mg by mouth daily.  esomeprazole (NEXIUM) 40 mg capsule Take 40 mg by mouth nightly. · It is important that you take the medication exactly as they are prescribed. · Keep your medication in the bottles provided by the pharmacist and keep a list of the medication names, dosages, and times to be taken in your wallet. · Do not take other medications without consulting your doctor. What to do at [facility] Recommended diet: Regular Diet, stay hydrated. You should take a stool softener such as colace for constipation. If constipation persists for >24 hours you should take a dose of Milk of Magnesia. Call if your constipation persists. If you have had a hysterectomy or vaginal surgery you may experience vaginal spotting. This is acceptable. Should the bleeding require more that 4 pads a day please call our office. Nothing per vagina for 6-8 weeks. Recommended activity: No driving for 1 week and/or while on pain medication Walk at least 6 times per day Showers okay, no tub bathing/swimming for two weeks Activity as able, stairs okay. If you had a laparoscopic procedure, no lifting greater than 25 lbs for 3 weeks.  
If you had an open procedure, no heavy lifting greater than 15 lbs for 6 weeks. If you experience any of the following persisting symptoms: 
 
Nausea/vomiting, fever > 101 F, diarrhea/constipation, increasing pain despite medication, increasing vaginal discharge or any concerns, please call our office. Follow-up with Dr. Adeel Xiong in 4 weeks. Call [phone] for an appointment. Information obtained by : 
I understand that if any problems occur once I am at home I am to contact my physician. I understand and acknowledge receipt of the instructions indicated above. Physician's or R.N.'s Signature                                                                  Date/Time Patient or Representative Signature                                                          Date/Time [employer] Activation Thank you for requesting access to [facility]. Please follow the instructions below to securely access and download your online medical record. [facility] allows you to send messages to your doctor, view your test results, renew your prescriptions, schedule appointments, and more. How Do I Sign Up? 1. In your internet browser, go to [email or web address] 
2. Click on the First Time User? Click Here link in the Sign In box. You will be redirect to the New Member Sign Up page. 3. Enter your [facility] Access Code exactly as it appears below. You will not need to use this code after youve completed the sign-up process. If you do not sign up before the expiration date, you must request a new code. [facility] Access Code: [email or web address] Expires: 2017 10:00 AM (This is the date your [facility] access code will ) 4.  Enter the last four digits of your Social Security Number (xxxx) and Date of Birth (mm/dd/yyyy) as indicated and click Submit. You will be taken to the next sign-up page. 5. Create a [employer] ID. This will be your [employer] login ID and cannot be changed, so think of one that is secure and easy to remember. 6. Create a [employer] password. You can change your password at any time. 7. Enter your Password Reset Question and Answer. This can be used at a later time if you forget your password. 8. Enter your e-mail address. You will receive e-mail notification when new information is available in [address] 9. Click Sign Up. You can now view and download portions of your medical record. 10. Click the Download Summary menu link to download a portable copy of your medical information. Additional Information If you have questions, please visit the Frequently Asked Questions section of the [employer] website at [email or web address] Remember, [employer] is NOT to be used for urgent needs. For medical emergencies, dial 911. Discharge Orders None Introducing [facility]! Bambijose guadalupe Garcia introduces [employer] patient portal. Now you can access parts of your medical record, email your doctor's office, and request medication refills online. 1. In your internet browser, go to [email or web address] 2. Click on the First Time User? Click Here link in the Sign In box. You will see the New Member Sign Up page. 3. Enter your [employer] Access Code exactly as it appears below. You will not need to use this code after youve completed the sign-up process. If you do not sign up before the expiration date, you must request a new code. · [employer] Access Code: [email or web address] Expires: 8/21/2017 10:00 AM 
 
4. Enter the last four digits of your Social Security Number (xxxx) and Date of Birth (mm/dd/yyyy) as indicated and click Submit. You will be taken to the next sign-up page. 5. Create a lifeaction gamest ID.  This will be your [employer] login ID and cannot be changed, so think of one that is secure and easy to remember. 6. Create a [employer] password. You can change your password at any time. 7. Enter your Password Reset Question and Answer. This can be used at a later time if you forget your password. 8. Enter your e-mail address. You will receive e-mail notification when new information is available in [address] 9. Click Sign Up. You can now view and download portions of your medical record. 10. Click the Download Summary menu link to download a portable copy of your medical information. If you have questions, please visit the Frequently Asked Questions section of the [employer] website. Remember, [employer] is NOT to be used for urgent needs. For medical emergencies, dial 911. Now available from your iPhone and Android! General Information Please provide this summary of care documentation to your next provider. Patient Signature:  ____________________________________________________________ Date:  ____________________________________________________________  
  
Arna Star Provider Signature:  ____________________________________________________________ Date:  ____________________________________________________________

## 2017-06-14 NOTE — BRIEF OP NOTE
BRIEF OPERATIVE NOTE    Date of Procedure: 6/14/2017   Preoperative Diagnosis: PELVIC MASS, ENDOMETRIAL HYPERPLASIA, MORBID OBESITY  Postoperative Diagnosis: PELVIC MASS, ENDOMETRIAL HYPERPLASIA, MORBID OBESITY    Procedure(s): ROBOTIC ASSISTED TOTAL LAPAROSCOPIC HYSTERECTOMY, BILATERAL SALPINGOOOPHORECTOMY, RESECTION OF MASS  Surgeon(s) and Role:     * Jessica Sommers MD - Primary  Surgical Staff:  Circ-1: Pa Garcia RN  Circ-Relief: Gunnar Blackburn RN  Scrub Tech-1: Eliza Nazario  Scrub Tech-Relief: Sandre Boas  Surg Asst-1: Vance Steven  Event Time In   Incision Start 1020   Incision Close      Anesthesia: General   Estimated Blood Loss: 25 cc  Specimens:   ID Type Source Tests Collected by Time Destination   1 : left adnexal mass Frozen Section Ovary  Jessica Sommers MD 6/14/2017 1116 Pathology   2 : uterus, right fallopian tube, ovary Frozen Section Uterus with Fallopian Tube & Ovary  Jessica Sommers MD 6/14/2017 1136 Pathology   1 : pelvic washings Fresh Other                  Jessica Sommers MD 6/14/2017 1057 Cytology      Findings: Mildly enlarged fibroid uterus. Large cystic left ovarian mass with adhesions to the posterior lower uterine segment and cervix. Normal right tube and ovary. On frozen section pathology the mass appeared benign. The uterus had some hyperplastic polyps with atypia and possibly an early grade 1 cancer.    Complications: None    Jessica Sommers MD

## 2017-06-14 NOTE — PERIOP NOTES
Patient: Norm Sorto MRN: [id number]  SSN: [id number]   YOB: 1955  Age: 63 y.o. Sex: female     Patient is status post Procedure(s):  ROBOTIC ASSISTED RESECTION OF MASS, TOTAL LAPAROSCOPIC HYSTERECTOMY, BILATERAL SALPHINGO OOPHORECTOMY, .     Surgeon(s) and Role:     * Marko Mcdonald MD - Primary                          Orogastric Tube 06/14/17 (Active)      Airway - Endotracheal Tube 06/14/17 Oral (Active)                   Dressing/Packing:  Wound Abdomen Anterior-DRESSING TYPE: Topical skin adhesive/glue (06/14/17 1100)

## 2017-06-14 NOTE — PERIOP NOTES
TRANSFER - OUT REPORT:    Verbal report given to Kandy(name) on Simon Hope  being transferred to 354(unit) for routine post - op       Report consisted of patients Situation, Background, Assessment and   Recommendations(SBAR). Time Pre op antibiotic [id number]  Anesthesia Stop time: 0893  Chan Present on Transfer to floor:yes  Order for Chan on Chart:yes    Information from the following report(s) SBAR, Kardex, OR Summary, Procedure Summary, Intake/Output, MAR, Recent Results and Med Rec Status was reviewed with the receiving nurse. Opportunity for questions and clarification was provided. Is the patient on 02? YES       L/Min 2       Other     Is the patient on a monitor? NO    Is the nurse transporting with the patient? NO    Surgical Waiting Area notified of patient's transfer from PACU? YES      The following personal items collected during your admission accompanied patient upon transfer:   Dental Appliance:    Vision:    Hearing Aid:    Jewelry: Jewelry: None  Clothing: Clothing:  (clothing to PACU)  Other Valuables: Other Valuables: Alpa Page, Sent home  Valuables sent to safe:        Clothing sent to floor.

## 2017-06-15 VITALS
DIASTOLIC BLOOD PRESSURE: 73 MMHG | TEMPERATURE: 98.5 F | WEIGHT: 293 LBS | BODY MASS INDEX: 47.09 KG/M2 | HEIGHT: 66 IN | SYSTOLIC BLOOD PRESSURE: 155 MMHG | RESPIRATION RATE: 16 BRPM | HEART RATE: 88 BPM | OXYGEN SATURATION: 97 %

## 2017-06-15 PROBLEM — D25.1 INTRAMURAL LEIOMYOMA OF UTERUS: Status: ACTIVE | Noted: 2017-06-15

## 2017-06-15 LAB
ANION GAP BLD CALC-SCNC: 6 MMOL/L (ref 5–15)
BUN SERPL-MCNC: 9 MG/DL (ref 6–20)
BUN/CREAT SERPL: 16 (ref 12–20)
CALCIUM SERPL-MCNC: 9 MG/DL (ref 8.5–10.1)
CHLORIDE SERPL-SCNC: 105 MMOL/L (ref 97–108)
CO2 SERPL-SCNC: 28 MMOL/L (ref 21–32)
CREAT SERPL-MCNC: 0.55 MG/DL (ref 0.55–1.02)
ERYTHROCYTE [DISTWIDTH] IN BLOOD BY AUTOMATED COUNT: 15.4 % (ref 11.5–14.5)
GLUCOSE SERPL-MCNC: 125 MG/DL (ref 65–100)
HCT VFR BLD AUTO: 36.6 % (ref 35–47)
HGB BLD-MCNC: 11.4 G/DL (ref 11.5–16)
MCH RBC QN AUTO: 25.9 PG (ref 26–34)
MCHC RBC AUTO-ENTMCNC: 31.1 G/DL (ref 30–36.5)
MCV RBC AUTO: 83 FL (ref 80–99)
PLATELET # BLD AUTO: 234 K/UL (ref 150–400)
POTASSIUM SERPL-SCNC: 3.5 MMOL/L (ref 3.5–5.1)
RBC # BLD AUTO: 4.41 M/UL (ref 3.8–5.2)
SODIUM SERPL-SCNC: 139 MMOL/L (ref 136–145)
WBC # BLD AUTO: 14.3 K/UL (ref 3.6–11)

## 2017-06-15 PROCEDURE — 74011250637 HC RX REV CODE- 250/637: Performed by: OBSTETRICS & GYNECOLOGY

## 2017-06-15 PROCEDURE — 36415 COLL VENOUS BLD VENIPUNCTURE: CPT | Performed by: OBSTETRICS & GYNECOLOGY

## 2017-06-15 PROCEDURE — 85027 COMPLETE CBC AUTOMATED: CPT | Performed by: OBSTETRICS & GYNECOLOGY

## 2017-06-15 PROCEDURE — 99218 HC RM OBSERVATION: CPT

## 2017-06-15 PROCEDURE — 80048 BASIC METABOLIC PNL TOTAL CA: CPT | Performed by: OBSTETRICS & GYNECOLOGY

## 2017-06-15 PROCEDURE — 77010033678 HC OXYGEN DAILY

## 2017-06-15 PROCEDURE — 74011250636 HC RX REV CODE- 250/636: Performed by: OBSTETRICS & GYNECOLOGY

## 2017-06-15 RX ORDER — OXYCODONE AND ACETAMINOPHEN 5; 325 MG/1; MG/1
1-2 TABLET ORAL
Qty: 30 TAB | Refills: 0 | Status: SHIPPED | OUTPATIENT
Start: 2017-06-15 | End: 2017-06-16 | Stop reason: SDUPTHER

## 2017-06-15 RX ORDER — ENOXAPARIN SODIUM 100 MG/ML
40 INJECTION SUBCUTANEOUS EVERY 24 HOURS
Qty: 14 SYRINGE | Refills: 0 | Status: SHIPPED | OUTPATIENT
Start: 2017-06-15 | End: 2017-07-20 | Stop reason: ALTCHOICE

## 2017-06-15 RX ADMIN — CEFAZOLIN 3 G: 1 INJECTION, POWDER, FOR SOLUTION INTRAMUSCULAR; INTRAVENOUS; PARENTERAL at 09:20

## 2017-06-15 RX ADMIN — CEFAZOLIN 3 G: 1 INJECTION, POWDER, FOR SOLUTION INTRAMUSCULAR; INTRAVENOUS; PARENTERAL at 01:20

## 2017-06-15 RX ADMIN — LOSARTAN POTASSIUM 100 MG: 50 TABLET ORAL at 09:16

## 2017-06-15 RX ADMIN — OXYCODONE HYDROCHLORIDE AND ACETAMINOPHEN 2 TABLET: 5; 325 TABLET ORAL at 09:45

## 2017-06-15 RX ADMIN — OXYCODONE HYDROCHLORIDE AND ACETAMINOPHEN 2 TABLET: 5; 325 TABLET ORAL at 14:02

## 2017-06-15 RX ADMIN — ENOXAPARIN SODIUM 40 MG: 40 INJECTION SUBCUTANEOUS at 14:09

## 2017-06-15 RX ADMIN — DICLOFENAC SODIUM 75 MG: 75 TABLET, DELAYED RELEASE ORAL at 09:16

## 2017-06-15 NOTE — DISCHARGE INSTRUCTIONS
MD Kassie Ayala MD Rico Mallory, PARUL    Patient Discharge Instructions    Maggie Espinoza / [id number] : 1955    Admitted 2017 Discharged: 6/15/2017     Take Home Medications     No current facility-administered medications on file prior to encounter. Current Outpatient Prescriptions on File Prior to Encounter   Medication Sig Dispense Refill    cyclobenzaprine (FLEXERIL) 10 mg tablet take 1 tablet by mouth every evening  0    traMADol (ULTRAM) 50 mg tablet Take 100 mg by mouth two (2) times a day.  meloxicam (MOBIC) 7.5 mg tablet Take 7.5 mg by mouth daily.  pravastatin (PRAVACHOL) 40 mg tablet Take 40 mg by mouth nightly.  MULTIVITS W-FE,OTHER MIN/LUT (CENTRUM SILVER ULTRA WOMEN'S PO) Take  by mouth.  cinnamon bark (CINNAMON) 500 mg cap Take 1,000 mg by mouth daily.  aspirin 81 mg chewable tablet Take 81 mg by mouth daily.  esomeprazole (NEXIUM) 40 mg capsule Take 40 mg by mouth nightly. · It is important that you take the medication exactly as they are prescribed. · Keep your medication in the bottles provided by the pharmacist and keep a list of the medication names, dosages, and times to be taken in your wallet. · Do not take other medications without consulting your doctor. What to do at Home    Recommended diet: Regular Diet, stay hydrated. You should take a stool softener such as colace for constipation. If constipation persists for >24 hours you should take a dose of Milk of Magnesia. Call if your constipation persists. If you have had a hysterectomy or vaginal surgery you may experience vaginal spotting. This is acceptable. Should the bleeding require more that 4 pads a day please call our office. Nothing per vagina for 6-8 weeks.     Recommended activity:   No driving for 1 week and/or while on pain medication  Walk at least 6 times per day  Showers okay, no tub bathing/swimming for two weeks  Activity as able, stairs okay. If you had a laparoscopic procedure, no lifting greater than 25 lbs for 3 weeks. If you had an open procedure, no heavy lifting greater than 15 lbs for 6 weeks. If you experience any of the following persisting symptoms:    Nausea/vomiting, fever > 101 F, diarrhea/constipation, increasing pain despite medication, increasing vaginal discharge or any concerns, please call our office. Follow-up with Dr. Devi Boone in 4 weeks. Call [phone] for an appointment. Information obtained by :  I understand that if any problems occur once I am at home I am to contact my physician. I understand and acknowledge receipt of the instructions indicated above. Physician's or R.N.'s Signature                                                                  Date/Time                                                                                                                                              Patient or Representative Signature                                                          Date/Time    [employer] Activation    Thank you for requesting access to [email or web address]. Please follow the instructions below to securely access and download your online medical record. [email or web address] allows you to send messages to your doctor, view your test results, renew your prescriptions, schedule appointments, and more. How Do I Sign Up? 1. In your internet browser, go to [email or web address]  2. Click on the First Time User? Click Here link in the Sign In box. You will be redirect to the New Member Sign Up page. 3. Enter your [email or web address] Access Code exactly as it appears below. You will not need to use this code after youve completed the sign-up process. If you do not sign up before the expiration date, you must request a new code.     [email or web address] Access Code: [email or web address]  Expires: 2017 10:00 AM (This is the date your [employer] access code will )    4. Enter the last four digits of your Social Security Number (xxxx) and Date of Birth (mm/dd/yyyy) as indicated and click Submit. You will be taken to the next sign-up page. 5. Create a [employer] ID. This will be your [employer] login ID and cannot be changed, so think of one that is secure and easy to remember. 6. Create a [employer] password. You can change your password at any time. 7. Enter your Password Reset Question and Answer. This can be used at a later time if you forget your password. 8. Enter your e-mail address. You will receive e-mail notification when new information is available in [address] 9. Click Sign Up. You can now view and download portions of your medical record. 10. Click the Download Summary menu link to download a portable copy of your medical information. Additional Information    If you have questions, please visit the Frequently Asked Questions section of the [employer] website at [email or web address] Remember, [employer] is NOT to be used for urgent needs. For medical emergencies, dial 911.

## 2017-06-15 NOTE — PROGRESS NOTES
[address] [facility], [facility]  [phone]         [phone]    MD Lucrecia Floyd MD Kayleen Barman. Kasi Neal NP     Patient Name: Deion Holm Date: 6/14/2017   OR Date: 6/14/2017       Visit Date: 6/15/2017        SUBJECTIVE:    No complaints this morning aside from incisional pain. OBJECTIVE:    Patient Vitals for the past 24 hrs:   Temp Pulse Resp BP SpO2   06/15/17 0402 98.1 °F (36.7 °C) 80 18 156/77 94 %   06/15/17 0003 98.4 °F (36.9 °C) 77 18 146/74 94 %   06/14/17 2043 99.4 °F (37.4 °C) 88 18 157/71 94 %   06/14/17 1736 99.7 °F (37.6 °C) (!) 105 17 119/83 94 %   06/14/17 1639 98.6 °F (37 °C) (!) 102 17 155/71 93 %   06/14/17 1510 98.2 °F (36.8 °C) 96 16 177/70 92 %   06/14/17 1430 - 95 11 177/84 96 %   06/14/17 1415 - 94 12 170/82 96 %   06/14/17 1400 - 92 12 176/78 96 %   06/14/17 1345 - 90 14 179/79 97 %   06/14/17 1330 - 93 14 179/79 97 %   06/14/17 1315 - 84 10 156/79 96 %   06/14/17 1300 - 85 11 180/77 97 %   06/14/17 1245 - 85 10 173/71 96 %   06/14/17 1235 - 82 12 154/71 97 %   06/14/17 1230 - 83 11 163/68 96 %   06/14/17 1228 97.6 °F (36.4 °C) - - - -   06/14/17 1225 - 82 14 147/79 97 %   06/14/17 1223 97.6 °F (36.4 °C) 81 12 144/67 96 %   06/14/17 1221 - - - 144/67 -   06/14/17 0852 98.2 °F (36.8 °C) (!) 102 16 141/71 95 %         Date 06/14/17 0700 - 06/15/17 0659 06/15/17 0700 - 06/16/17 0659   Shift 3990-7814 9455-1218 24 Hour Total 2926-5733 3590-2058 24 Hour Total   I  N  T  A  K  E   P.O. 480  480         P. O. 480  480       I.V.  (mL/kg/hr) 1933.3  (0.9) 1504.2  (0.7) 3437.5  (0.8)         I.V. 250  250         Volume (0.9% sodium chloride infusion) 583. 3 1404.2 1987.5         Volume (lactated ringers infusion) 1000  1000         Volume (ceFAZolin (ANCEF) 3 g in 0.9%  ml IVPB) 100 100 200       Shift Total  (mL/kg) 2413.3  (14.2) 1504.2  (8.8) 3917.5  (23)      O  U  T  P  U  T   Urine  (mL/kg/hr) 1300  (0.6) 745  (0.4) 2045  (0.5)         Urine Voided 1000  1000         Urine Output (mL) ([REMOVED] Urinary Catheter 06/14/17 2- way; Chan)        Blood 30  30         Estimated Blood Loss 30  30       Shift Total  (mL/kg) 1330  (7.8) 745  (4.4) 2075  (12.2)      NET 1083. 3 759.2 1842.5      Weight (kg) 170.1 170.1 170.1 170.1 170.1 170.1       Physical Exam     General:  alert, cooperative, no distress     Cardiac:  Regular rate and rhythm        Lungs:  clear to auscultation bilaterally  Abdomen:  soft, non-tender, without masses or organomegaly       Wound:  clean, dry, no drainage   Extremity: extremities normal, atraumatic, no cyanosis or edema      Lab Results   Component Value Date/Time    WBC 14.3 06/15/2017 04:14 AM    ABS. NEUTROPHILS 7.0 06/12/2017 09:23 AM    HGB 11.4 06/15/2017 04:14 AM    HCT 36.6 06/15/2017 04:14 AM    MCV 83.0 06/15/2017 04:14 AM    MCH 25.9 06/15/2017 04:14 AM    PLATELET 578 51/64/9587 04:14 AM     Lab Results   Component Value Date/Time    Sodium 139 06/15/2017 04:14 AM    Potassium 3.5 06/15/2017 04:14 AM    Chloride 105 06/15/2017 04:14 AM    CO2 28 06/15/2017 04:14 AM    Glucose 125 06/15/2017 04:14 AM    BUN 9 06/15/2017 04:14 AM    Creatinine 0.55 06/15/2017 04:14 AM    Calcium 9.0 06/15/2017 04:14 AM    Albumin 3.9 06/12/2017 09:23 AM    Bilirubin, total 0.5 06/12/2017 09:23 AM    AST (SGOT) 16 06/12/2017 09:23 AM    ALT (SGPT) 26 06/12/2017 09:23 AM    Alk. phosphatase 134 06/12/2017 09:23 AM       IMPRESSION/PLAN:    1 Day Post-Op Procedure(s):  ROBOTIC ASSISTED RESECTION OF MASS, TOTAL LAPAROSCOPIC HYSTERECTOMY, BILATERAL SALPINGOOOPHORECTOMY for PELVIC MASS, ENDOMETRIAL HYPERPLASIA    Oncologic:  Benign left adnexal mass on frozen section. Hyperplastic polyps with atypia on frozen section pathology, possibly with an early malignancy. Await final pathology. Heme/CV:  Hemodynamically stable. Appropriate Hgb. Renal:  Good UOP. Normal creatinine. FEN/GI:  Diet as tolerated.    ID/Wound: Afebrile. PPX:  Lovenox. Dispostion:  Doing well postoperatively. Will d/c home today.         Michelle Levine MD

## 2017-06-15 NOTE — PROGRESS NOTES
Bedside shift change report given to Abigail Duke (oncoming nurse) by Azeb Lagunas (offgoing nurse). Report included the following information SBAR, Kardex, OR Summary, Procedure Summary, Intake/Output, MAR, Accordion, Recent Results and Med Rec Status.

## 2017-06-16 ENCOUNTER — TELEPHONE (OUTPATIENT)
Dept: GYNECOLOGY | Age: 62
End: 2017-06-16

## 2017-06-16 RX ORDER — OXYCODONE AND ACETAMINOPHEN 5; 325 MG/1; MG/1
1 TABLET ORAL
Qty: 10 TAB | Refills: 0 | Status: SHIPPED | OUTPATIENT
Start: 2017-06-16 | End: 2018-05-31 | Stop reason: ALTCHOICE

## 2017-06-16 NOTE — TELEPHONE ENCOUNTER
The pt states she cut back to just one Oxycodone so she would not run out so quickly. She has been unable to sleep due to the pain and is now taking two at night. She is requesting another prescription for Oxycodone and Ativan to help her sleep.

## 2017-06-16 NOTE — TELEPHONE ENCOUNTER
I consulted with Catrina Beck and she called the pt to discuss. She advised her due to strict laws on narcotics she is only able to give her 10 more of the pain medication and that she will need to contact her pcp for something for insomnia. The pt will have her   the prescription today.

## 2017-06-19 ENCOUNTER — TELEPHONE (OUTPATIENT)
Dept: GYNECOLOGY | Age: 62
End: 2017-06-19

## 2017-06-19 NOTE — TELEPHONE ENCOUNTER
Pt , she states she is still having lower pelvic pain and lower back pain that is an 8/10 on pain scale. She is requesting additional pain medication, she states not necessarily percocet, but 800 mg ibuprofen. I advised that is she is still having that much pain we may need to see her in the office, she states she does not want to come here if office visit is needed but she will go to her PCP, she lives in [place] [id number]. She is taking the percocet every 6 hours along with 2 tramadol. She states she has been on tramadol for arthritis and fibromyalgia.   I advised will discuss with Ras Pimentel and call her back at [phone]

## 2017-07-20 ENCOUNTER — OFFICE VISIT (OUTPATIENT)
Dept: GYNECOLOGY | Age: 62
End: 2017-07-20

## 2017-07-20 VITALS
BODY MASS INDEX: 47.09 KG/M2 | SYSTOLIC BLOOD PRESSURE: 165 MMHG | HEIGHT: 66 IN | DIASTOLIC BLOOD PRESSURE: 80 MMHG | HEART RATE: 87 BPM | WEIGHT: 293 LBS

## 2017-07-20 DIAGNOSIS — C54.1 ENDOMETRIAL CANCER (HCC): Primary | ICD-10-CM

## 2017-07-20 RX ORDER — PREGABALIN 75 MG/1
CAPSULE ORAL
Refills: 0 | COMMUNITY
Start: 2017-07-14 | End: 2018-06-11 | Stop reason: CLARIF

## 2017-07-20 NOTE — PROGRESS NOTES
[address] [facility] [address], [address]  [phone]  Dr. Katie Osgood, III    Dr. Haley Wilson, Jacki Cole, PARUL    Postoperative Office Note  Patient ID:  Name: Wander Eduardo  MRM: [id number]  : 1955/61 y.o. Date: 2017    Diagnosis:     ICD-10-CM ICD-9-CM    1. Endometrial cancer ([facility] C54.1 182.0        Problem List:   Patient Active Problem List   Diagnosis Code    Pelvic mass R19.00    Endometrial hyperplasia N85.00    Morbid obesity with BMI of 60.0-69.9, adult ([facility] E66.01, Z68.44    Intramural leiomyoma of uterus D25.1    Endometrial [facility] ([facility] C54.1       SUBJECTIVE:    Wander Eduardo is a 63 y.o. female who presents for followup postoperative care following robotic assisted TLH, BSO. The patient's pathology revealed: FINAL PATHOLOGIC DIAGNOSIS   1. Left adnexal mass, salpingo-oophorectomy:   Serous cystadenofibroma   Benign fallopian tube, no histopathologic changes   2.  Uterus, right fallopian tube and ovary, hysterectomy and salpingo-oophorectomy:   Endometrioid adenocarcinoma of the uterine corpus, FIGO grade 1 of 3, arising in an endometrial polyp with complex hyperplasia   No evidence of myometrial invasion (0 of 2.3cm)   Benign cervix with mild chronic cervicitis and squamous metaplasia, no tumor seen   Benign endometrial polyps   Leiomyoma, calcified   Adenomyosis   Benign ovary, no histopathologic changes   Benign fallopian tube, no histopathologic changes   ENDOMETRIUM: SYNOPTIC REPORT   Specimen: Uterine corpus   SPECIMEN   Procedure   Procedure: Simple hysterectomy   Additional Procedures: Bilateral salpingo-oophorectomy   Specimen Integrity: Intact hysterectomy specimen   Lymph Node Sampling: Not performed   TUMOR   Histologic Type: Endometrioid adenocarcinoma   Histologic Grade: FIGO grade 1   Tumor Extent   Myometrial Invasion: Not identified   Tumor Involvement of Cervix : Not involved   Extent of Involvement of Other Organs:   Accessory Tumor Findings   Lymph-Vascular Invasion: Not identified   LYMPH NODES   Regional Lymph Nodes: No lymph nodes submitted or found   STAGE (pTNM)   Primary Tumor (pT): pT1a   Regional Lymph Nodes (pN)   Category (pN): pNX     Currently she has no problems with eating, bowel movements, or voiding. Appetite is good. Eating a regular diet without difficulty. Bowel movements are regular. The patient is not having any pain. Medications:     Current Outpatient Prescriptions on File Prior to Visit   Medication Sig Dispense Refill    losartan (COZAAR) 100 mg tablet Take 100 mg by mouth daily.  AMLODIPINE BESYLATE, BULK, 10 mg by Does Not Apply route every morning.  diclofenac EC (VOLTAREN) 75 mg EC tablet Take 75 mg by mouth two (2) times a day.  cholecalciferol, vitamin D3, (VITAMIN D3) 2,000 unit tab Take 1 Tab by mouth daily.  GLUCOSAMINE HCL/CHONDR WOLF A NA (OSTEO BI-FLEX PO) Take 2 Tabs by mouth daily.  DOCOSAHEXANOIC ACID/EPA (FISH OIL PO) Take 1,200 mg by mouth two (2) times a day.  cyanocobalamin (VITAMIN B-12) 1,000 mcg tablet Take 1,000 mcg by mouth daily.  cyclobenzaprine (FLEXERIL) 10 mg tablet take 1 tablet by mouth every evening  0    traMADol (ULTRAM) 50 mg tablet Take 100 mg by mouth two (2) times a day.  meloxicam (MOBIC) 7.5 mg tablet Take 7.5 mg by mouth daily.  pravastatin (PRAVACHOL) 40 mg tablet Take 40 mg by mouth nightly.  aspirin 81 mg chewable tablet Take 81 mg by mouth daily.  MULTIVITS W-FE,OTHER MIN/LUT (CENTRUM SILVER ULTRA WOMEN'S PO) Take  by mouth.  cinnamon bark (CINNAMON) 500 mg cap Take 1,000 mg by mouth daily.  esomeprazole (NEXIUM) 40 mg capsule Take 40 mg by mouth nightly.  oxyCODONE-acetaminophen (PERCOCET) 5-325 mg per tablet Take 1 Tab by mouth every four (4) hours as needed. Max Daily Amount: 6 Tabs.  10 Tab 0     No current facility-administered medications on file prior to visit. Allergies: Allergies   Allergen Reactions    Bee Sting [Sting, Bee] Swelling     Past Medical History:   Diagnosis Date    Arthritis     Autoimmune disease ([facility]     FIBROMYALGIA     Chronic pain     Diabetes ([facility] [facility]     diet controlled    Fibromyalgia     GERD (gastroesophageal reflux disease)     Hypercholesteremia     Hypertension     Ill-defined condition     LYMPHEDEMA    Ill-defined condition     NEUROPATHY BOTH FEET    Ill-defined condition     DEGENERATIVE DISC DISEASE- SPURS    Osteoarthritis of both knees     Plantar fasciitis of left foot      Past Surgical History:   Procedure Laterality Date    HX DILATION AND CURETTAGE  2015    HX GI      COLONOSCOPY    HX ORTHOPAEDIC      right knee arthroscopy    HX ORTHOPAEDIC      L FOOT SURGERY    HX TUBAL LIGATION  1997     Social History     Social History    Marital status:      Spouse name: N/A    Number of children: N/A    Years of education: N/A     Occupational History    Not on file.      Social History Main Topics    Smoking status: Never Smoker    Smokeless tobacco: Never Used    Alcohol use No    Drug use: No    Sexual activity: Not on file     Other Topics Concern    Not on file     Social History Narrative       OBJECTIVE:    Vitals:   Vitals:    07/20/17 0954   BP: 165/80   Pulse: 87   Weight: (!) 169.6 kg (374 lb)   Height: 5' 5.98\" (1.676 m)     Physical Examination:     General:  alert, cooperative, no distress   Lungs: lungs clear to auscultation   Cardiac: Regular rate and rhythm   Abdomen: soft, bowel sounds active, non-tender  No CVA tenderness   Incision: healing well   Pelvic: External genitalia: normal general appearance  Urinary system: urethral meatus normal  Vaginal: normal without tenderness, induration or masses  Cervix: absent  Adnexa: removed surgically  Uterus: absent   Rectal: not done   Extremity:   extremities normal, atraumatic, no cyanosis or edema IMPRESSION:    Alex Gr is doing well postoperatively. She has a diagnosis of stage IA, grade 1 endometrial cancer. Medical problems:     Patient Active Problem List   Diagnosis Code    Pelvic mass R19.00    Endometrial hyperplasia N85.00    Morbid obesity with BMI of 60.0-69.9, adult ([facility] E66.01, Z68.44    Intramural leiomyoma of uterus D25.1    Endometrial cancer ([facility] C54.1       PLAN:    The operative procedures and clinical results have been reviewed with the patient. Implications of diagnosis discussed at length. All questions answered. No additional therapy recommended. I explained the rationale for and schedule of surveillance follow up. I will see the patient back in three months to begin disease surveillance. The patient is advised to call our office with any problems or concerns.     Washington Pedro MD  7/20/2017/10:04 AM

## 2017-07-20 NOTE — PROGRESS NOTES
Post operative follow up. Patient states no abnormal spotting or bleeding. Patient states no questions or concerns for today's visit. The pt is no longer taking the Oxycodone. The pt will follow up with her pcp regarding increased bp.

## 2017-10-31 ENCOUNTER — HOSPITAL ENCOUNTER (OUTPATIENT)
Dept: LAB | Age: 62
Discharge: HOME | End: 2017-10-31
Payer: COMMERCIAL

## 2017-10-31 ENCOUNTER — OFFICE VISIT (OUTPATIENT)
Dept: GYNECOLOGY | Age: 62
End: 2017-10-31

## 2017-10-31 VITALS
DIASTOLIC BLOOD PRESSURE: 82 MMHG | HEIGHT: 66 IN | SYSTOLIC BLOOD PRESSURE: 132 MMHG | WEIGHT: 293 LBS | HEART RATE: 97 BPM | BODY MASS INDEX: 47.09 KG/M2

## 2017-10-31 DIAGNOSIS — C54.1 ENDOMETRIAL CANCER (HCC): Primary | ICD-10-CM

## 2017-10-31 PROCEDURE — 88175 CYTOPATH C/V AUTO FLUID REDO: CPT | Performed by: OBSTETRICS & GYNECOLOGY

## 2017-10-31 RX ORDER — DULOXETIN HYDROCHLORIDE 30 MG/1
CAPSULE, DELAYED RELEASE ORAL
Refills: 0 | COMMUNITY
Start: 2017-10-27

## 2017-10-31 NOTE — PROGRESS NOTES
Three month check up. Patient states no abnormal spotting or bleeding. Patient states no questions or concerns for today's visit.

## 2017-10-31 NOTE — PROGRESS NOTES
[address] [place] [address] [address], [address] [facility]  [phone]  MD Jovon Maguire MD Noni Collar, NP    Office Note  Patient ID:  Name:  Norberto Samuel  MRN:  [id number]  :  1955/62 y.o. Date:  10/31/2017      HISTORY OF PRESENT ILLNESS:  Norberto Samuel is a 63 y.o.  postmenopausal female with a diagnosis of stage I, grade 1 endometrial carcinoma. She underwent robotic assisted hysterectomy in 2017. She was not recommended any adjuvant therapy. She presents today for routine surveillance. She is doing well and is without complaints. She denies any vaginal bleeding or discharge, any pelvic or abdominal pain, or any changes in her bowel or bladder habits. She is up to date on her MMG and colonoscopy. ROS:   and GI review:  Negative  Cardiopulmonary review:  Negative   Musculoskeletal:  Negative    A comprehensive review of systems was negative except for that written in the History of Present Illness.  , 10 point ROS        Problem List:  Patient Active Problem List    Diagnosis Date Noted    Endometrial cancer ([facility] 2017    Intramural leiomyoma of uterus 06/15/2017    Pelvic mass 2017    Endometrial hyperplasia 2017    Morbid obesity with BMI of 60.0-69.9, adult ([address] 2017     PMH:  Past Medical History:   Diagnosis Date    Arthritis     Autoimmune disease ([address]     FIBROMYALGIA     Chronic pain     Diabetes ([facility]     diet controlled    Fibromyalgia     GERD (gastroesophageal reflux disease)     Hypercholesteremia     Hypertension     Ill-defined condition     LYMPHEDEMA    Ill-defined condition     NEUROPATHY BOTH FEET    Ill-defined condition     DEGENERATIVE DISC DISEASE- SPURS    Osteoarthritis of both knees     Plantar fasciitis of left foot       PSH:  Past Surgical History:   Procedure Laterality Date    HX DILATION AND CURETTAGE      HX GI      COLONOSCOPY  HX ORTHOPAEDIC      right knee arthroscopy    HX ORTHOPAEDIC      L FOOT SURGERY    HX TUBAL LIGATION  1997      Social History:  Social History   Substance Use Topics    Smoking status: Never Smoker    Smokeless tobacco: Never Used    Alcohol use No      Family History:  Family History   Problem Relation Age of Onset    Heart Disease Father     Emphysema Father     Thyroid Disease Mother     Bleeding Prob Mother      CLOTS    Arthritis-osteo Sister     Bleeding Prob Sister      CLOTS    Arthritis-osteo Brother     Bleeding Prob Brother      CLOTS    Kidney Disease Sister      CANCER    Arthritis-osteo Brother     Cancer Brother      PROSTATE    Other Brother      AA      Medications: (reviewed)  Current Outpatient Prescriptions   Medication Sig    DULoxetine (CYMBALTA) 30 mg capsule take 1 capsule by mouth once daily    losartan (COZAAR) 100 mg tablet Take 100 mg by mouth daily.  AMLODIPINE BESYLATE, BULK, 10 mg by Does Not Apply route every morning.  cholecalciferol, vitamin D3, (VITAMIN D3) 2,000 unit tab Take 1 Tab by mouth daily.  GLUCOSAMINE HCL/CHONDR WOLF A NA (OSTEO BI-FLEX PO) Take 2 Tabs by mouth daily.  DOCOSAHEXANOIC ACID/EPA (FISH OIL PO) Take 1,200 mg by mouth two (2) times a day.  cyanocobalamin (VITAMIN B-12) 1,000 mcg tablet Take 1,000 mcg by mouth daily.  cyclobenzaprine (FLEXERIL) 10 mg tablet take 1 tablet by mouth every evening    traMADol (ULTRAM) 50 mg tablet Take 100 mg by mouth two (2) times a day.  pravastatin (PRAVACHOL) 40 mg tablet Take 40 mg by mouth nightly.  aspirin 81 mg chewable tablet Take 81 mg by mouth daily.  MULTIVITS W-FE,OTHER MIN/LUT (CENTRUM SILVER ULTRA WOMEN'S PO) Take  by mouth.  cinnamon bark (CINNAMON) 500 mg cap Take 1,000 mg by mouth daily.  esomeprazole (NEXIUM) 40 mg capsule Take 40 mg by mouth nightly.     LYRICA 75 mg capsule     oxyCODONE-acetaminophen (PERCOCET) 5-325 mg per tablet Take 1 Tab by mouth every four (4) hours as needed. Max Daily Amount: 6 Tabs.  diclofenac EC (VOLTAREN) 75 mg EC tablet Take 75 mg by mouth two (2) times a day.  meloxicam (MOBIC) 7.5 mg tablet Take 7.5 mg by mouth daily. No current facility-administered medications for this visit. Allergies: (reviewed)  Allergies   Allergen Reactions    Bee Sting [Sting, Bee] Swelling          OBJECTIVE:    Physical Exam:  VITAL SIGNS: Vitals:    10/31/17 1426   BP: 132/82   Pulse: 97   Weight: (!) 362 lb 6.4 oz (164.4 kg)   Height: 5' 5.98\" (1.676 m)     Body mass index is 58.52 kg/(m^2). GENERAL TAYA: Conversant, alert, oriented. No acute distress. HEENT: HEENT. No thyroid enlargement. No JVD. Neck: Supple without restrictions. RESPIRATORY: Clear to auscultation and percussion to the bases. No CVAT. CARDIOVASC: RRR without murmur/rub. GASTROINT: soft, non-tender, without masses or organomegaly   MUSCULOSKEL: no joint tenderness, deformity or swelling   EXTREMITIES: extremities normal, atraumatic, no cyanosis or edema   PELVIC: External genitalia: normal general appearance  Urinary system: urethral meatus normal  Vaginal: normal without tenderness, induration or masses  Cervix: absent  Adnexa: removed surgically  Uterus: absent   RECTAL: Deferred   FADIA SURVEY: No suspicious lymphadenopathy or edema noted. NEURO: Grossly intact. No acute deficit.          Lab Results   Component Value Date/Time    WBC 14.3 06/15/2017 04:14 AM    HGB 11.4 06/15/2017 04:14 AM    HCT 36.6 06/15/2017 04:14 AM    PLATELET 290 89/76/4964 04:14 AM    MCV 83.0 06/15/2017 04:14 AM     Lab Results   Component Value Date/Time    Sodium 139 06/15/2017 04:14 AM    Potassium 3.5 06/15/2017 04:14 AM    Chloride 105 06/15/2017 04:14 AM    CO2 28 06/15/2017 04:14 AM    Anion gap 6 06/15/2017 04:14 AM    Glucose 125 06/15/2017 04:14 AM    BUN 9 06/15/2017 04:14 AM    Creatinine 0.55 06/15/2017 04:14 AM    BUN/Creatinine ratio 16 06/15/2017 04:14 AM    GFR est AA >60 06/15/2017 04:14 AM    GFR est non-AA >60 06/15/2017 04:14 AM    Calcium 9.0 06/15/2017 04:14 AM       IMPRESSION/PLAN:  Matthieu Snyder is a 63 y.o. female with a diagnosis of stage IA, grade 1 endometrial cancer. She has no evidence of disease on today's exam.  We will see her back in 3 months for continued surveillance of disease.         Signed By: Gwen Phillip MD     10/31/2017/3:06 PM

## 2017-11-03 NOTE — PROGRESS NOTES
Addended by: AURORA LOGAN on: 2/15/2017 08:18 AM     Modules accepted: Orders     Normal letter sent to address on file

## 2018-05-31 ENCOUNTER — OFFICE VISIT (OUTPATIENT)
Dept: GYNECOLOGY | Age: 63
End: 2018-05-31

## 2018-05-31 VITALS
BODY MASS INDEX: 47.09 KG/M2 | DIASTOLIC BLOOD PRESSURE: 97 MMHG | WEIGHT: 293 LBS | HEART RATE: 97 BPM | HEIGHT: 66 IN | SYSTOLIC BLOOD PRESSURE: 169 MMHG

## 2018-05-31 DIAGNOSIS — R10.32 LEFT LOWER QUADRANT PAIN: ICD-10-CM

## 2018-05-31 DIAGNOSIS — C54.1 ENDOMETRIAL CANCER (HCC): Primary | ICD-10-CM

## 2018-05-31 NOTE — PROGRESS NOTES
[address] [place] [address] [address], [address] [facility]  [phone]  MD Jose Timmons MD Rueben Moulds, NP    Office Note  Patient ID:  Name:  Sean Ramsay  MRN:  [id number]  :  1955/62 y.o. Date:  2018      HISTORY OF PRESENT ILLNESS:  Sean Ramsay is a 63 y.o.  postmenopausal female with a diagnosis of stage I, grade 1 endometrial carcinoma. She underwent robotic assisted hysterectomy in 2017. She was not recommended any adjuvant therapy. She presents today for routine surveillance. She is doing well and is without complaints, except for pain in the left lower quadrant. She feels the pain is significant and she is concerned. She denies any vaginal bleeding or discharge or any changes in her bowel or bladder habits. She is up to date on her MMG and colonoscopy. ROS:   and GI review:  Negative  Cardiopulmonary review:  Negative   Musculoskeletal:  Negative    A comprehensive review of systems was negative except for that written in the History of Present Illness.  , 10 point ROS        Problem List:  Patient Active Problem List    Diagnosis Date Noted    Endometrial cancer ([address] 2017    Intramural leiomyoma of uterus 06/15/2017    Pelvic mass 2017    Endometrial hyperplasia 2017    Morbid obesity with BMI of 60.0-69.9, adult ([address] 2017     PMH:  Past Medical History:   Diagnosis Date    Arthritis     Autoimmune disease ([address]     FIBROMYALGIA     Chronic pain     Diabetes ([address]     diet controlled    Fibromyalgia     GERD (gastroesophageal reflux disease)     Hypercholesteremia     Hypertension     Ill-defined condition     LYMPHEDEMA    Ill-defined condition     NEUROPATHY BOTH FEET    Ill-defined condition     DEGENERATIVE DISC DISEASE- SPURS    Osteoarthritis of both knees     Plantar fasciitis of left foot       PSH:  Past Surgical History:   Procedure Laterality Date    HX DILATION AND CURETTAGE  2015    HX GI      COLONOSCOPY    HX ORTHOPAEDIC      right knee arthroscopy    HX ORTHOPAEDIC      L FOOT SURGERY    HX TUBAL LIGATION  1997      Social History:  Social History   Substance Use Topics    Smoking status: Never Smoker    Smokeless tobacco: Never Used    Alcohol use No      Family History:  Family History   Problem Relation Age of Onset    Heart Disease Father     Emphysema Father     Thyroid Disease Mother     Bleeding Prob Mother      CLOTS    Arthritis-osteo Sister     Bleeding Prob Sister      CLOTS    Arthritis-osteo Brother     Bleeding Prob Brother      CLOTS    Kidney Disease Sister      CANCER    Arthritis-osteo Brother     Cancer Brother      PROSTATE    Other Brother      AA      Medications: (reviewed)  Current Outpatient Prescriptions   Medication Sig    DULoxetine (CYMBALTA) 30 mg capsule take 1 capsule by mouth once daily    losartan (COZAAR) 100 mg tablet Take 100 mg by mouth daily.  AMLODIPINE BESYLATE, BULK, 10 mg by Does Not Apply route every morning.  diclofenac EC (VOLTAREN) 75 mg EC tablet Take 75 mg by mouth two (2) times a day.  cholecalciferol, vitamin D3, (VITAMIN D3) 2,000 unit tab Take 1 Tab by mouth daily.  DOCOSAHEXANOIC ACID/EPA (FISH OIL PO) Take 1,200 mg by mouth two (2) times a day.  cyanocobalamin (VITAMIN B-12) 1,000 mcg tablet Take 1,000 mcg by mouth daily.  traMADol (ULTRAM) 50 mg tablet Take 100 mg by mouth two (2) times a day.  pravastatin (PRAVACHOL) 40 mg tablet Take 40 mg by mouth nightly.  aspirin 81 mg chewable tablet Take 81 mg by mouth daily.  MULTIVITS W-FE,OTHER MIN/LUT (CENTRUM SILVER ULTRA WOMEN'S PO) Take  by mouth.  cinnamon bark (CINNAMON) 500 mg cap Take 1,000 mg by mouth daily.  esomeprazole (NEXIUM) 40 mg capsule Take 40 mg by mouth nightly.  LYRICA 75 mg capsule     GLUCOSAMINE HCL/CHONDR WOLF A NA (OSTEO BI-FLEX PO) Take 2 Tabs by mouth daily.     cyclobenzaprine (FLEXERIL) 10 mg tablet take 1 tablet by mouth every evening    meloxicam (MOBIC) 7.5 mg tablet Take 7.5 mg by mouth daily. No current facility-administered medications for this visit. Allergies: (reviewed)  Allergies   Allergen Reactions    Bee Sting [Sting, Bee] Swelling          OBJECTIVE:    Physical Exam:  VITAL SIGNS: Vitals:    05/31/18 0934   Weight: (!) 369 lb (167.4 kg)   Height: 5' 5.98\" (1.676 m)     Body mass index is 59.59 kg/(m^2). GENERAL TAYA: Conversant, alert, oriented. No acute distress. HEENT: HEENT. No thyroid enlargement. No JVD. Neck: Supple without restrictions. RESPIRATORY: Clear to auscultation and percussion to the bases. No CVAT. CARDIOVASC: RRR without murmur/rub. GASTROINT: soft, non-tender, without masses or organomegaly, obese   MUSCULOSKEL: no joint tenderness, deformity or swelling   EXTREMITIES: extremities normal, atraumatic, no cyanosis or edema   PELVIC: External genitalia: normal general appearance  Urinary system: urethral meatus normal  Vaginal: normal without tenderness, induration or masses  Cervix: absent  Adnexa: removed surgically  Uterus: absent   RECTAL: Deferred   FADIA SURVEY: No suspicious lymphadenopathy or edema noted. NEURO: Grossly intact. No acute deficit.          Lab Results   Component Value Date/Time    WBC 14.3 (H) 06/15/2017 04:14 AM    HGB 11.4 (L) 06/15/2017 04:14 AM    HCT 36.6 06/15/2017 04:14 AM    PLATELET 679 08/91/2272 04:14 AM    MCV 83.0 06/15/2017 04:14 AM     Lab Results   Component Value Date/Time    Sodium 139 06/15/2017 04:14 AM    Potassium 3.5 06/15/2017 04:14 AM    Chloride 105 06/15/2017 04:14 AM    CO2 28 06/15/2017 04:14 AM    Anion gap 6 06/15/2017 04:14 AM    Glucose 125 (H) 06/15/2017 04:14 AM    BUN 9 06/15/2017 04:14 AM    Creatinine 0.55 06/15/2017 04:14 AM    BUN/Creatinine ratio 16 06/15/2017 04:14 AM    GFR est AA >60 06/15/2017 04:14 AM    GFR est non-AA >60 06/15/2017 04:14 AM Calcium 9.0 06/15/2017 04:14 AM       IMPRESSION/PLAN:  Marija Gomes is a 63 y.o. female with a diagnosis of stage IA, grade 1 endometrial cancer. She has no evidence of disease on today's exam.  I am going to send her for a CT of the abdomen/pelvis to evaluate her pain. I will see her back to review the results.          Signed By: Arline Pitts MD     5/31/2018/3:06 PM

## 2018-05-31 NOTE — PROGRESS NOTES
Pt c/o intermittent pain in LLQ that started a few months ago. Pt also c/o urinary incontinence and urgency since the surgery.

## 2018-06-06 LAB
CLINICAL INFORMATION: NORMAL
COMMENT, 90001280: NORMAL
CYTOTECHNOLOGIST: NORMAL
GENERAL CATEGORIZATION: NORMAL
INFECTION: NORMAL
INTERPRETATION/RESULT: NORMAL
LMP: NORMAL
PATHOLOGIST: NORMAL
PREV.BX: NORMAL
PREV.PAP: NORMAL
REPORT STATUS: NORMAL
REVIEW CYTOTECHNOLOGIST: NORMAL
SOURCE: NORMAL
STATEMENT OF ADEQUACY: NORMAL

## 2018-06-11 ENCOUNTER — OFFICE VISIT (OUTPATIENT)
Dept: GYNECOLOGY | Age: 63
End: 2018-06-11

## 2018-06-11 VITALS
SYSTOLIC BLOOD PRESSURE: 161 MMHG | HEART RATE: 93 BPM | DIASTOLIC BLOOD PRESSURE: 88 MMHG | BODY MASS INDEX: 48.82 KG/M2 | WEIGHT: 293 LBS | HEIGHT: 65 IN

## 2018-06-11 DIAGNOSIS — C54.1 ENDOMETRIAL CANCER (HCC): Primary | ICD-10-CM

## 2018-06-11 NOTE — PROGRESS NOTES
[address], [address] [place]  [phone]  MD Jessica Carter MD Margit Pereyra, PARUL    Office Note  Patient ID:  Name:  Tamiko Ruff  MRN:  [id number]  :  1955/62 y.o. Date:  2018      HISTORY OF PRESENT ILLNESS:  Tamiko Ruff is a 63 y.o.  postmenopausal female with a diagnosis of stage I, grade 1 endometrial carcinoma. She underwent robotic assisted hysterectomy in 2017. She was not recommended any adjuvant therapy. She presented recently for routine surveillance. She is doing well and is without complaints, except for pain in the left lower quadrant. She feels the pain is significant and she is concerned. She denies any vaginal bleeding or discharge or any changes in her bowel or bladder habits. She is up to date on her MMG and colonoscopy. I sent her for a CT of the abdomen/pelvis to evaluate her pain. It showed nothing significant, except for some left kidney stones. ROS:   and GI review:  Negative  Cardiopulmonary review:  Negative   Musculoskeletal:  Negative    A comprehensive review of systems was negative except for that written in the History of Present Illness.  , 10 point ROS        Problem List:  Patient Active Problem List    Diagnosis Date Noted    Endometrial cancer ([facility] 2017    Intramural leiomyoma of uterus 06/15/2017    Pelvic mass 2017    Endometrial hyperplasia 2017    Morbid obesity with BMI of 60.0-69.9, adult ([address] 2017     PMH:  Past Medical History:   Diagnosis Date    Arthritis     Autoimmune disease ([address]     FIBROMYALGIA     Chronic pain     Diabetes ([address]     diet controlled    Fibromyalgia     GERD (gastroesophageal reflux disease)     Hypercholesteremia     Hypertension     Ill-defined condition     LYMPHEDEMA    Ill-defined condition     NEUROPATHY BOTH FEET    Ill-defined condition     DEGENERATIVE DISC DISEASE- SPURS    Osteoarthritis of both knees     Plantar fasciitis of left foot       PSH:  Past Surgical History:   Procedure Laterality Date    HX DILATION AND CURETTAGE  2015    HX GI      COLONOSCOPY    HX ORTHOPAEDIC      right knee arthroscopy    HX ORTHOPAEDIC      L FOOT SURGERY    HX TUBAL LIGATION  1997      Social History:  Social History   Substance Use Topics    Smoking status: Never Smoker    Smokeless tobacco: Never Used    Alcohol use No      Family History:  Family History   Problem Relation Age of Onset    Heart Disease Father     Emphysema Father     Thyroid Disease Mother     Bleeding Prob Mother      CLOTS    Arthritis-osteo Sister     Bleeding Prob Sister      CLOTS    Arthritis-osteo Brother     Bleeding Prob Brother      CLOTS    Kidney Disease Sister      CANCER    Arthritis-osteo Brother     Cancer Brother      PROSTATE    Other Brother      AA      Medications: (reviewed)  Current Outpatient Prescriptions   Medication Sig    DULoxetine (CYMBALTA) 30 mg capsule take 1 capsule by mouth once daily    losartan (COZAAR) 100 mg tablet Take 100 mg by mouth daily.  AMLODIPINE BESYLATE, BULK, 10 mg by Does Not Apply route every morning.  diclofenac EC (VOLTAREN) 75 mg EC tablet Take 75 mg by mouth two (2) times a day.  cholecalciferol, vitamin D3, (VITAMIN D3) 2,000 unit tab Take 1 Tab by mouth daily.  GLUCOSAMINE HCL/CHONDR WOLF A NA (OSTEO BI-FLEX PO) Take 2 Tabs by mouth daily.  DOCOSAHEXANOIC ACID/EPA (FISH OIL PO) Take 1,200 mg by mouth two (2) times a day.  cyanocobalamin (VITAMIN B-12) 1,000 mcg tablet Take 1,000 mcg by mouth daily.  cyclobenzaprine (FLEXERIL) 10 mg tablet take 1 tablet by mouth every evening    traMADol (ULTRAM) 50 mg tablet Take 100 mg by mouth two (2) times a day.  pravastatin (PRAVACHOL) 40 mg tablet Take 40 mg by mouth nightly.  aspirin 81 mg chewable tablet Take 81 mg by mouth daily.     MULTIVITS W-FE,OTHER MIN/LUT (CENTRUM SILVER ULTRA WOMEN'S PO) Take  by mouth.  cinnamon bark (CINNAMON) 500 mg cap Take 1,000 mg by mouth daily.  esomeprazole (NEXIUM) 40 mg capsule Take 40 mg by mouth nightly. No current facility-administered medications for this visit. Allergies: (reviewed)  Allergies   Allergen Reactions    Bee Sting [Sting, Bee] Swelling          OBJECTIVE:    Physical Exam:  VITAL SIGNS: Vitals:    06/11/18 1332 06/11/18 1337   BP: (!) 204/86 161/88   Pulse: 100 93   Weight: (!) 365 lb 9.6 oz (165.8 kg)    Height: 5' 5\" (1.651 m)      Body mass index is 60.84 kg/(m^2). GENERAL TAYA: Conversant, alert, oriented. No acute distress. HEENT: HEENT. No thyroid enlargement. No JVD. Neck: Supple without restrictions. RESPIRATORY: Clear to auscultation and percussion to the bases. No CVAT. CARDIOVASC: RRR without murmur/rub. GASTROINT: soft, non-tender, without masses or organomegaly, obese   MUSCULOSKEL: no joint tenderness, deformity or swelling   EXTREMITIES: extremities normal, atraumatic, no cyanosis or edema   PELVIC: Deferred   RECTAL: Deferred   FADIA SURVEY: No suspicious lymphadenopathy or edema noted. NEURO: Grossly intact. No acute deficit.          Lab Results   Component Value Date/Time    WBC 14.3 (H) 06/15/2017 04:14 AM    HGB 11.4 (L) 06/15/2017 04:14 AM    HCT 36.6 06/15/2017 04:14 AM    PLATELET 849 32/09/6453 04:14 AM    MCV 83.0 06/15/2017 04:14 AM     Lab Results   Component Value Date/Time    Sodium 139 06/15/2017 04:14 AM    Potassium 3.5 06/15/2017 04:14 AM    Chloride 105 06/15/2017 04:14 AM    CO2 28 06/15/2017 04:14 AM    Anion gap 6 06/15/2017 04:14 AM    Glucose 125 (H) 06/15/2017 04:14 AM    BUN 9 06/15/2017 04:14 AM    Creatinine 0.55 06/15/2017 04:14 AM    BUN/Creatinine ratio 16 06/15/2017 04:14 AM    GFR est AA >60 06/15/2017 04:14 AM    GFR est non-AA >60 06/15/2017 04:14 AM    Calcium 9.0 06/15/2017 04:14 AM       CT of abdomen/pelvis (6/4/18) - [facility]  No masses. No lymphadenopathy. No ascites. Small and large bowel within normal limits. Small umbilical hernia containing fat. 7 mm left kidney stone in lower pole. 2 mm stone adjacent to the larger stone. IMPRESSION/PLAN:  Deepti Hoang is a 63 y.o. female with a diagnosis of stage IA, grade 1 endometrial cancer. She has no evidence of disease on today's exam.  Her CT shows no evidence of disease and no obvious cause of her pain. She is to follow-up with urology for evaluation of the stones.         Signed By: Timmy Najera MD     6/11/2018/3:06 PM

## 2019-10-21 ENCOUNTER — APPOINTMENT (OUTPATIENT)
Dept: PHYSICAL THERAPY | Age: 64
End: 2019-10-21

## 2019-10-25 ENCOUNTER — HOSPITAL ENCOUNTER (OUTPATIENT)
Dept: PHYSICAL THERAPY | Age: 64
Discharge: HOME | End: 2019-10-25
Payer: OTHER GOVERNMENT

## 2019-10-25 VITALS — DIASTOLIC BLOOD PRESSURE: 81 MMHG | HEART RATE: 124 BPM | OXYGEN SATURATION: 94 % | SYSTOLIC BLOOD PRESSURE: 126 MMHG

## 2019-10-25 PROCEDURE — 97110 THERAPEUTIC EXERCISES: CPT

## 2019-10-25 PROCEDURE — 97140 MANUAL THERAPY 1/> REGIONS: CPT

## 2019-10-25 PROCEDURE — 97162 PT EVAL MOD COMPLEX 30 MIN: CPT

## 2019-10-25 NOTE — PROGRESS NOTES
[address]        OUTPATIENT physical Therapy Evaluation with CMS G codes    NAME: Kelli Aguilar AGE: 60 y.o. GENDER: female  DATE: 10/25/2019  REFERRING PHYSICIAN: Dr. Claudene Gary:  Patient presents with 2+ year h/o gradual slow onset of initially L LE swelling and then progression to b/l LE feet and ankles. Swelling then travelled up to the calf region and contained with c/o increased swelling of above knee region. Patient reported initially when symptoms began 2 years ago she was referred to [facility] for compression stockings. Patient reports was fitted for knee highs (per descriptions sounds like ready made [employer]) that eventually became painful and did not fit well so she stopped wearing them after 6 mo. Patient reports some wounds on the back of her thighs and pain in the legs limiting her ADL's and hobbies. Primary Diagnosis:  · B/L  LE lymphedema, secondary (I89.0)    Other Treatment Diagnoses:  · Abnormality of gait (R26.9)  · Swelling not relieved by elevation (R60.9)  · Morbid obesity (E66.01)  · Diabetic condition (E11.69)    Date of Onset: 6/2017    Present Symptoms and Functional Limitations: Decreased ability to sleep in her bed and sleeps in a recliner, wounds on the back of her legs, difficulty finding shoes, clothing to fit, decreased ability to stand and cook over 20-30 min in duration. Needs to sit <-> stand for meal prep/cooking. Only able to walk household distances and with h/o 1 fall about a year ago. Reports GLF while walking and legs gave out and she crumbled to the ground. Lymphedema Life Impact Scale: Score of 53 and impairment percentage of 78%. See scanned document.      Past Medical History:   Past Medical History:   Diagnosis Date    Arthritis     Autoimmune disease ([facility] [address] FIBROMYALGIA     Cancer ([facility] [address] 2017    Stage IA grade 1 endometrial cancer    Chronic pain     Fibromyalgia    Diabetes (HCC)     diet controlled    Fibromyalgia     GERD (gastroesophageal reflux disease)     Hypercholesteremia     Hypertension     Ill-defined condition     LYMPHEDEMA    Ill-defined condition     NEUROPATHY BOTH FEET    Ill-defined condition     DEGENERATIVE DISC DISEASE- SPURS    Ill-defined condition     Hyperlipidemia    Osteoarthritis of both knees     Plantar fasciitis of left foot      Past Surgical History:   Procedure Laterality Date    HX DILATION AND CURETTAGE  2015    HX GI      COLONOSCOPY    HX HYSTERECTOMY  06/2017    s/p robotic assisted hysterectomy due to endometrial cancer. no adjuvent therapy    HX ORTHOPAEDIC      right knee arthroscopy    HX ORTHOPAEDIC      L FOOT SURGERY    HX TUBAL LIGATION  1997     Current Medications:  See media for updated scanned list  Current Outpatient Medications   Medication Sig    DULoxetine (CYMBALTA) 30 mg capsule take 1 capsule by mouth once daily    losartan (COZAAR) 100 mg tablet Take 100 mg by mouth daily.  AMLODIPINE BESYLATE, BULK, 10 mg by Does Not Apply route every morning.  diclofenac EC (VOLTAREN) 75 mg EC tablet Take 75 mg by mouth two (2) times a day.  cholecalciferol, vitamin D3, (VITAMIN D3) 2,000 unit tab Take 1 Tab by mouth daily.  GLUCOSAMINE HCL/CHONDR WOLF A NA (OSTEO BI-FLEX PO) Take 2 Tabs by mouth daily.  DOCOSAHEXANOIC ACID/EPA (FISH OIL PO) Take 1,200 mg by mouth two (2) times a day.  cyanocobalamin (VITAMIN B-12) 1,000 mcg tablet Take 1,000 mcg by mouth daily.  cyclobenzaprine (FLEXERIL) 10 mg tablet take 1 tablet by mouth every evening    traMADol (ULTRAM) 50 mg tablet Take 100 mg by mouth two (2) times a day.  pravastatin (PRAVACHOL) 40 mg tablet Take 40 mg by mouth nightly.  aspirin 81 mg chewable tablet Take 81 mg by mouth daily.     MULTIVITS W-FE,OTHER MIN/LUT (CENTRUM SILVER ULTRA WOMEN'S PO) Take  by mouth.  cinnamon bark (CINNAMON) 500 mg cap Take 1,000 mg by mouth daily.  esomeprazole (NEXIUM) 40 mg capsule Take 40 mg by mouth nightly. No current facility-administered medications for this encounter. Allergies: Allergies   Allergen Reactions    Bee Sting [Sting, Bee] Swelling      Social/Work History and Prior Level of Function:  Retired. 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 Living Situation: Private residence [address] with spouse   Trainable Caregiver?: Self, Spouse   Self-care/ADLs: Mod I   Mobility: Mod I household, limited distances. Sleeping Arrangement:  Recliner 100%   Adaptive Equipment Owned: RW, shower chair   Previous Therapy:  Was fitted for compression stockings and wore x 6 mo  Compression/Lymphedema Equipment:  Ready Made Compression knee highs by [employer] fitted and worn x 6 mo. Unable to tolerate due to pain/uncomfortable. SUBJECTIVE:  Patient c/o swelling L>R gradual slow onset over the last few years with worsening of above knee swelling and greater amount of pain in the evening vs morning. Patient c/o pain at top of thighs. Patient c/o \"hardening\" of her legs and wounds on the back of her legs. C/O decreased hobbies such as gardening, and decreased functional mobility due to pain and size of legs. Patients goals for therapy: \"Reduce swelling and get rid of fluid\"    EVALUATION AND OBJECTIVE DATA SUMMARY:   Pain:Now 6/10; worst 9/10 at night          Self-Care and ADLs:    Grooming: Independent  Bathing: Independent    UB Dressing: Independent  LB Dressing: Independent    Don/Doff Shoes/Socks: Modified independent  Toileting: Independent      Skin and Tissue Assessment:  Dermal Status:  []   Intact [x]  Dry   []  Tenuous [] Flaky   [x]  Wound/lesion present:  Back of thighs []  Scars   [x]  Dermatitis: Skin folds of b/l knees and medial upper thigh due to skin to skin contact. Texture/Consistency:  []  Boggy []  Pitting Edema   [x]  Brawny []  Combination   [x]  Fibrotic/Woody:  Medial/posterior above knee  Thigh region L>R    Pigmentation/Color Change:  []  Normal []  Hemosiderin   []  Red []  Erythematous   [x]  Hyperpigmented []  Hyperlipodermatosclerosis   Anomalies:  []  Lymphorrhea []  Vesicles   []  Petechiae []  Warty Vercusis   []  Bullae:   [x]  Papilloma: medial posterior above knee thigh region   Circulatory:  []  LAI []  Varicosities:   []  Pulses []  Vascular studies ruled out DVT in past   []  DVT History    Nails:  []   Normal  [x]   Fungus: mycotic and yellow all nails b/l feet  Observation:    B/L anterior view                         Posterior View             Posterior leg just below buttock, close up        Left Lateral view               Right Lateral View                          B/L Dorsal feet/ankle      Stemmers Sign: pos  Height:   5'6\"  Weight:   372.4lbs  BMI:   60.1  (36 or greater: adversely affecting lymphedema)  Wound/Ulcer:    No open areas    Wound Pain:     Volumetric Measurements:   Right:  *28,652.40mL Left:  30,335.70mL   % Difference: 5.87% L LE larger than R   (See scanned graph)    Range of Motion: decreased knee hip due to body habitus  Strength: decreased 3+/5  Sensation:   Intact  Mobility:   Independent  Safety:  Patient is alert and oriented:  Yes   Safety awareness:  Yes   Fall Risk?:  no   Patient given written fall prevention handout: Yes   Precautions:  Standard lymphedema precautions to include avoiding blood pressure readings, injections and IVs or other procedures/acts that could lead to broken skin on affected area, and avoiding excessive heat, resistive activity or altitude without compression garment    Evaluation Time: 10:00 -1030  TREATMENT PROVIDED:   Treatment time:  8392-3323 Minutes: 30  1. Treatment description:  Therapeutic Exercise and Procedure: Patient instructed in activity restrictions and exercise guidelines. Therapist demonstrated deep abdominal breathing and a remedial lymphedema range of motion exercise program to be done in sitting. Patient was able to complete the routine times 5 reps and deep abdominal breathing with  fair performance. VC to slow rate of breathing and to avoid upper chest breathing. Patient has been asked to complete breathing exercises and the decongestive exercise routine daily. ACOLS page 1 and parts of page 2 given to patient. Added 5' walking program household only to be completed 6-7 days/week with precautions on \"walk and talk\" for level of intensity. Patient to increase by 2 min increment as tolerated and described signs of intolerance vs progression. PT educated patient on value of aquatic exercise program and should start with an aquatic PT due to fear of water. PT educated patient on benefits of aquatic exercise in relation to lymphedema. Treatment time:    Minutes: 20  2. Treatment description:  Manual Therapy: The patient was education regarding the role and function of the lymphatic system, and instructed in the lymphedema management protocol of complete decongestive therapy (CDT). This includes skin care to prevent breakdown or infection, lymphedema exercises, custom compression therapy options (bandaging, compression garments, compression pump, Otila Prophet, JoViPak, [employer], etc. as needed), and decongestion with manual lymphatic drainage as indicated. We discussed the need for consistent compression system for lymphedema management. Diaphragmatic breathing instruction and extensive skin care education was performed due to h/o DM and with skin fold dermatis noted. She is to perform daily soap and water washing, ,applying low pH lotion to extremity using upward strokes to stimulate lymphatic vessels, antifungal spray on toes, and to use corn starch or diaper ointment for skin protection of skin fold regions.   PT suggesting long handle brush to wash feet due to difficulty reaching toes. Pt was given information regarding obtaining bandaging supplies through [employer] compression. .  Patient instructed in skin care principles and anatomy of the lymphatic system. ASSESSMENT:   Sergio England is a 60 y.o. motivated morbidly obese female who presents today with secondary stage 3 lymphedema of b/l LE with L LE larger than R with a gradual onset x 2+ years. Patient was s/p robotic assisted hysterectomy 2017 due to endometrial stage 1A cancer. Patient without any adjuvant therapy. Patient does not know if lymph nodes were removed/sampled. Patient with significant trophic changes of the skin on the medial knee lobules and thigh regions due to chronic swelling. She has abnormal shaping of the entire LE and will require custom compression equipment to manage her equipment after she has been decongested in bandages. The patients percent volume difference is 5.87%, however both LE's are involved. Patient will benefit from complete decongestive therapy (CDT) including manual lymphatic drainage (MLD) technique, short-stretch textile bandages/compression system to decongest limb, and kinesiotaping as appropriate. Patient will receive instruction in proper skin care to recognize signs/symptoms of and prevent infection, therapeutic exercise, and self-MLD for independent home program and restorative lymphatic performance. This care is medically necessary due to the infection risk with lymphedema and to improve functional activities. CDT is necessary to resolve swelling to allow patient to return to wearing normal clothes/footwear and prevent worsening of symptoms such as venous stasis ulcerations, infections, or hospitalizations. Patient will be independent with home program strategies to allow improved ADL ability and mobility and to allow patient to return to greatest functional independence. Rehabilitation potential is considered to be Good.   Factors which may influence rehabilitation potential include medical history, financial, transportation, spouse with medical issues limiting carryover at home, and BMI. Patient will benefit from 2x/week  physical therapy visits over 10-12 weeks to optimize improvement in these areas. PLAN OF CARE:   Recommendations and Planned Interventions:  Manual lymph drainage/complete decongestive therapy  Multi-layer compression bandaging (short-stretch)  Compression garment fitting/provision  Lymphedema therapeutic exercise  AROM/PROM/Strength/Coordination  Self-care training  Functional mobility training  Education in skin care and lymphedema precautions  Self-MLD education per home program  Self-bandaging education per home program  Caregiver education as needed  Wound care as needed     GOALS  Short term goals  Time frame: 4  1. Patient will demonstrate knowledge of signs/symptoms of infections/cellulitis and be independent in skin care to prevent cellulitis. 2.  Patient will demonstrate independence in lymphedema home program of therapeutic exercises to improve circulation and decongest limb to improve ADLs. 3.  Patient will tolerate multi-layer bandages (MLB) and show measureable decrease in limb volume to allow ordering of home compression system (daytime, nighttime garments and pump as needed). 4.  Volume reduction by 1000mL L LE.  5.  Spouse will be able to perform below knee MLB with min A in order to improve compliance in compression. 6.  Reduced pain of thigh region 3-4/10 in afternoons with use of compression. Long term goals  Time frame: 8  1. Pt will show improvement in Lymphedema Life Impact Scale by decreasing impairment percentage to under 65% and thus allow improvement in patient's quality of life. 2.  Patient will be independent with don/doff of compression system and use in order to prevent re accumulation of fluid at discharge.   3.  Pt will be independent in self-MLD and show stable limb volumes showing decongestion and pt. ready for transition to independent restorative phase of lymphedema therapy. 4.  Patient with volume reduction by 2,000 from evaluation     In compliance with CMSs Claims Based Outcome Reporting, the following G-code set was chosen for this patient based on their primary functional limitation being treated: The outcome measure chosen to determine the severity of the functional limitation was the Lymphedema Life Impact Scale with a score of 53 and a percentage of 78% which was correlated with the impairment scale. ? Other PT/OT Primary Functional Limitations:     - CURRENT STATUS: CL - 60%-79% impaired, limited or restricted    - GOAL STATUS: CK - 40%-59% impaired, limited or restricted    - D/C STATUS:  ---------------To be determined---------------     Patient has participated in goal setting and agrees to work toward plan of care. Patient was instructed to call if questions or concerns arise. Thank you for this referral.  Bin Camp DPT   Time Calculation: 80 mins    TREATMENT PLAN EFFECTIVE DATES:   10/25/2019 TO 1/20/20  I have read the above plan of care for Katelynn Perez. I certify the above prescribed services are required by this patient and are medically necessary.   The above plan of care has been developed in conjunction with the lymphedema/physical therapist.       Physician Signature: ____________________________________Date:______________

## 2019-11-11 ENCOUNTER — HOSPITAL ENCOUNTER (OUTPATIENT)
Dept: PHYSICAL THERAPY | Age: 64
Discharge: HOME | End: 2019-11-11
Payer: OTHER GOVERNMENT

## 2019-11-11 PROCEDURE — 97110 THERAPEUTIC EXERCISES: CPT

## 2019-11-11 PROCEDURE — 97140 MANUAL THERAPY 1/> REGIONS: CPT

## 2019-11-11 NOTE — PROGRESS NOTES
_                                    [address] [facility]  [address] [facility]  [address]  [address]        LYmphedema Therapy  Visit: 2  1 / 8 tx visits Authorized until 12/9/19      [x]                  Daily note               []                 30 day/10th visit progress note    NAME: Jayna Johnson  DATE: 11/11/2019    GOALS  Short term goals  Time frame: 4  1. Patient will demonstrate knowledge of signs/symptoms of infections/cellulitis and be independent in skin care to prevent cellulitis. 2.  Patient will demonstrate independence in lymphedema home program of therapeutic exercises to improve circulation and decongest limb to improve ADLs. 3.  Patient will tolerate multi-layer bandages (MLB) and show measurable decrease in limb volume to allow ordering of home compression system (daytime, nighttime garments and pump as needed). 4.  Volume reduction by 1000mL L LE.  5.  Spouse will be able to perform below knee MLB with min A in order to improve compliance in compression. 6.  Reduced pain of thigh region 3-4/10 in afternoons with use of compression.     Long term goals  Time frame: 8  1. Pt will show improvement in Lymphedema Life Impact Scale by decreasing impairment percentage to under 65% and thus allow improvement in patient's quality of life. 2.  Patient will be independent with don/doff of compression system and use in order to prevent re accumulation of fluid at discharge. 3.  Pt will be independent in self-MLD and show stable limb volumes showing decongestion and pt. ready for transition to independent restorative phase of lymphedema therapy. 4.  Patient with volume reduction by 2,000 from evaluation       SUBJECTIVE REPORT:  Patient arrives with all supplies in hand and cast shoe. Patient very eager and well read on lymphedema. Asking if any herbal supplements are ok for lymphedema.  Patient stating she has started to reduce wheat and dairy from her diet. Pain:6/10 Left knee lobule region with heavy \"ache\" type pain              Gait:    [x]  Independent gait without any assistive device for community distances premorbidly, however asked to use SPC during bandaging phase and while in clinic demonstrated mod I with SPC and cast shoe R LE. ADLs:    Treatment Response:    [x]   Patient reviewed packet received at evaluation and asking appropriate questions regarding future POC/compression options/Insurance Auth  []   Patient completed home program as prescribed  []   Patient not fully compliant with home program to date  []   Patient waiting on compression garment arrival  Function: MOd I with assistance from spouse as needed. []   Patient requiring less assistance with completion of home program  []   ADLs are requiring less assistance   []   Patient able to return to work/leisure activities  Lymphedema Life Impact Scale: scores with % impairment  Weight:   TREATMENT AND OBJECTIVE DATA SUMMARY:   Patient/Family Education: Need to f/u with Dermatologist regarding skin fold cream/powder; and the lesion noted on the R abdominal flank (appears like a hematoma) patient describes them as \"boils that never have pus, only blood\" She states she uses betadine cream when they burst. Her PCP is aware of them and suspects local staph infection. PT suggesting to seek Dermatology opinion. Educated in skin care: [x]   Skin care products:  Creams, f/u with dermatology regarding abdominal lesion  []   Hygiene  []  Prevention of cellulitis  []   Wound care   Educated in exercise: [x]   Walking program  []   Lyn ball routine  []   Stick routine  [x]   ROM routine   Instructed in self MLD: Verbal sequence given for opening IA on the left; left inguinal and L upper thigh with upward strokes towards outer left hip. Reviewed with patient as well as demonstration and instruction during MLD portion of the session.    Instructed in don/doff of compression system: [x]   Multi layer bandage (MLB) donning principles and wear precautions:  Remove MLB if:  1. Unusual shortness of breath  2. Unrelieved pain despite performance of exercise  3. Uncontrolled itching/discomfort  4. Toes/fingers are blue/cold/numb. Poor capillary refill  5. If bandages are soiled or wet. [x]   Day garments: Will require custom  [x]   Night garments: Will require custom due to stage 3. Considering the [employer] for L and R upper leg       Therapeutic Exercise/Procedure 15 minutes   Treatment time:7354-4973  ACOL Exercise Routine As part of HEP to perform page 1, and 2. Removed elevated leg series as they are too difficult. Ankle pumps; CW/ CCW ankle and walking to reduce s/s of tightness of MLB. Stick exercise program: N/A   Free exercises/ROM: N/A   Home program: Patient to perform daily to BID:  [x]   Skin care  [x]   Deep abdominal breathing  [x]   Exercise routine  []   Walking program  []   Rest in supine   [x]   Compression bandage  []   Compression garments  []   Vasopneumatic device  []   Wound care  [x]   Self MLD  []   Bring supplies to each therapy visit  []   Purchase necessary supplies  []   Weight loss program  [x]   Follow up with:  Dermatologist regarding R flank \"hematoma/boil\"     Rationale: Exercise will increase the lymph angiomotoricity and tissue pressure of the skin and thus decrease swelling.      Modalities 0 minutes   Treatment time:  Vasopneumatic pump:      Manual Lymphatic Drainage (MLD) 65 minutes   Treatment time: 8870-6487  Area to decongest:  [] LE             []  Right     [x]  Left      [] Trunk    Below knee   Sequence used and effectiveness: [x] Secondary/Primary sequence for lower extremities with / without trunk involvement:    Diaphragmatic breathing x 5 pre and post; Supraclavicular; Lateral cervical; L axillary; open L IA; L inguinal; L secondary with banding medial to lateral hip in 3 places; L LE sequence to dorsal foot. Increased time spent on medial knee lobule. [] Modifications were made to manual lymph drainage sequence to exclude cervical techniques secondary to patient's age    [] Self MLD sequence/techniques/hand strokes   Skin Care/Observation: Dry skin entire leg with increased redness in skin fold areas along L breast L inguinal and L popliteal. PT noted R lower abdominal hematoma-like area and patient states her PCP is aware and they come and go. She equates them to a \"boil\" but without pus. They do open sometime and leak \"dark blood\". Upper/Lower extremity compression: Applied multi-layer compression bandaging to: Below knee [x]  Thigh high  []   Upper Extremity []    Right []   Left [x]    Bilateral []    The following multi-layer bandages were applied:  []  Tricofix            []  Silver Stockinette             [x]  Tg soft  []  Comperm    []  Transelast     Padding layer:  []  Cellona         [x]  Fleece    Foam:  [x] 10cm roll  [] 12cm roll  [] 15cm roll  [] Gray foam  [] Komprex  [] Komprex 2      Short stretch bandages:   [] 4cm  [x] 6cm  [x] 8cm  [x] 10cm  [] 12cm  Educated patient in multi-layer bandaging donning principles and precautions. Kinesiotaping:    Girth/Volume measurement: See below     TOTAL TREATMENT 80 mins     Circumferential Limb Measurements:  Affected Side: B/L   Left (cm) Right (cm)   5th Tuberosity 24    24      Ankle 31.5    28.2      Calf 52.4    52.5      Mid Knee 81.7    73      Thigh 99    91.4      Groin 106.5    101      Length                 ASSESSMENT:   Treatment effectiveness and tolerance: Patient tolerated today's MLD and below knee MLB on the L LE well. PT noted R abdominal region a hematoma like area that patient describes as a boil that come and go episodically. PT suggesting a dermatology consult at this time. Medial knee lobule very fibrotic and with papillomas. Some skin areas looking tenuous she is at risk for wounds at this point.   Patient post treatment was able to don post op shoe on the left and ambulate with SPC safely throughout the clinic. PT suggesting spouse to attend future visits to learn bandaging techniques. Patient is in agreement and very motivated to work on her lymphedema. Progress toward goals: Initiated     PLAN OF CARE:   Changes to the plan of care: 1. Patient to follow up with Dermatology regarding abdominal sore. 2.  Progress to thigh high L LE as patient tolerates in 1-2 visits and teach spouse how to bandage.    Frequency: [x]  2 times a week  []  Weekly  []  Biweekly  []  Monthly   Other: [employer] compression: self pay bandages and post op shoe       Aubrie Anand, DPT

## 2019-11-13 ENCOUNTER — HOSPITAL ENCOUNTER (OUTPATIENT)
Dept: PHYSICAL THERAPY | Age: 64
Discharge: HOME | End: 2019-11-13
Payer: OTHER GOVERNMENT

## 2019-11-13 PROCEDURE — 97140 MANUAL THERAPY 1/> REGIONS: CPT

## 2019-11-13 PROCEDURE — 97116 GAIT TRAINING THERAPY: CPT

## 2019-11-13 NOTE — PROGRESS NOTES
_                                    [address] [facility]  [address] [facility] [address] [facility]  [address]        LYmphedema Therapy  Visit: 3  2 / 8 tx visits Authorized until 12/9/19      [x]                  Daily note               []                 30 day/10th visit progress note    NAME: Jessi Nix  DATE: 11/13/2019    GOALS  Short term goals  Time frame: 4  1. Patient will demonstrate knowledge of signs/symptoms of infections/cellulitis and be independent in skin care to prevent cellulitis. 2.  Patient will demonstrate independence in lymphedema home program of therapeutic exercises to improve circulation and decongest limb to improve ADLs. 3.  Patient will tolerate multi-layer bandages (MLB) and show measurable decrease in limb volume to allow ordering of home compression system (daytime, nighttime garments and pump as needed). 4.  Volume reduction by 1000mL L LE.  5.  Spouse will be able to perform below knee MLB with min A in order to improve compliance in compression. 6.  Reduced pain of thigh region 3-4/10 in afternoons with use of compression.     Long term goals  Time frame: 8  1. Pt will show improvement in Lymphedema Life Impact Scale by decreasing impairment percentage to under 65% and thus allow improvement in patient's quality of life. 2.  Patient will be independent with don/doff of compression system and use in order to prevent re accumulation of fluid at discharge. 3.  Pt will be independent in self-MLD and show stable limb volumes showing decongestion and pt. ready for transition to independent restorative phase of lymphedema therapy. 4.  Patient with volume reduction by 2,000 from evaluation       SUBJECTIVE REPORT:  Patient arrives with L LE MLB on and using SPC for ambulation. No c/o with bandage. Stating \"It felt so much better with the wrap on\". Patient's spouse here for family training.   Patient reporting trying to drink more water per day and reducing her salt intake. Reports she is urinating frequently. Has dermatology appointment next week. Reports an open area on the thigh of the left leg. Pain:6/10 Left knee cap. Reports heaviness with the wrap and unsure if she can manage the MLB. Gait:    [x]  Independent gait without any assistive device for community distances premorbidly:  Recommended bariatric standard/2 wheeled RW for ambulation mod I. ADLs:    Treatment Response:    [x]   Patient reviewed packet received at evaluation and asking appropriate questions regarding future POC/compression options/Insurance Auth  []   Patient completed home program as prescribed  []   Patient not fully compliant with home program to date  []   Patient waiting on compression garment arrival  Function: MOd I with assistance from spouse as needed. []   Patient requiring less assistance with completion of home program  []   ADLs are requiring less assistance   []   Patient able to return to work/leisure activities  Lymphedema Life Impact Scale: scores with % impairment  Weight:   TREATMENT AND OBJECTIVE DATA SUMMARY:   Patient/Family Education: Need to f/u with Dermatologist regarding skin fold cream/powder; and the lesion noted on the R abdominal flank (appears like a hematoma) patient describes them as \"boils that never have pus, only blood\" --Patient has scheduled a Derm appointment. Discussed the need for a bariatric RW/standard walker to decrease WB on L Knee due to \"severe OA\" vs using the [facility].      Educated in skin care: [x]   Skin care products:  Creams, f/u with dermatology regarding abdominal lesion  []   Hygiene  []  Prevention of cellulitis  []   Wound care   Educated in exercise: [x]   Walking program  []   Lyn ball routine  []   Stick routine  [x]   ROM routine   Instructed in self MLD: Verbal sequence given for opening IA on the left; left inguinal and L upper thigh with upward strokes towards outer left hip. Reviewed with patient as well as demonstration and instruction during MLD portion of the session. Instructed in don/doff of compression system: [x]   Multi layer bandage (MLB) donning principles and wear precautions:  Remove MLB if:  1. Unusual shortness of breath  2. Unrelieved pain despite performance of exercise  3. Uncontrolled itching/discomfort  4. Toes/fingers are blue/cold/numb. Poor capillary refill  5. If bandages are soiled or wet. [x]   Day garments: Will require custom  [x]   Night garments: Will require custom due to stage 3. Considering the [employer] for L and R upper leg       Therapeutic Exercise/Procedure 0 minutes   Treatment time:  ACOL Exercise Routine To date as part of HEP: to perform page 1, and 2. Removed elevated leg series as they are too difficult. Ankle pumps; CW/ CCW ankle and walking to reduce s/s of tightness of MLB. Stick exercise program: N/A   Free exercises/ROM: N/A   Home program: Patient to perform daily to BID:  [x]   Skin care  [x]   Deep abdominal breathing  [x]   Exercise routine  [x]   Walking program  []   Rest in supine   [x]   Compression bandage  []   Compression garments  []   Vasopneumatic device  []   Wound care  [x]   Self MLD  []   Bring supplies to each therapy visit  []   Purchase necessary supplies  []   Weight loss program  [x]   Follow up with:  Dermatologist regarding R flank \"hematoma/boil\"     Rationale: Exercise will increase the lymph angiomotoricity and tissue pressure of the skin and thus decrease swelling.      Modalities 0 minutes   Treatment time:  Vasopneumatic pump:      Manual Lymphatic Drainage (MLD) 65 minutes   Treatment time: 7103-9173  Area to decongest:  [] LE             []  Right     [x]  Left      [] Trunk    Below knee   Sequence used and effectiveness: [x] Secondary/Primary sequence for lower extremities with / without trunk involvement:    Diaphragmatic breathing x 5 pre and post; Supraclavicular; Lateral cervical; superficial abdominal; L axillary; open L IA; L inguinal; L secondary with banding medial to lateral hip in 3 places; L LE sequence to dorsal foot. Increased time spent on medial knee lobule. [] Modifications were made to manual lymph drainage sequence to exclude cervical techniques secondary to patient's age    [] Self MLD sequence/techniques/hand strokes   Skin Care/Observation:    Wound Properties and Description:  Wound location:  Posterior left thigh  Present on evaluation?: closed at eval, open now  Wound Dimensions: 1.4 cm x 1.2 cm x 0.3mm  Condition of base: clean  Condition of edges: dry, curled  Necrotic to granulation (%):   Depth/Direction of tunnel: none  Depth/Direction of underminin.3mm  Drainage amount: none  Drainage color: n/a  Wound odor: none  Periwound skin condition: healthy  Wound Treatment:   1. Skin prep spray to area   2. Dressing type applied: Mepilex AG border 2x2 Removed MLB L below knee, wiped with hygienic wipes. Noted improved skin turgor at ankle with some wrinkling of skin. Dry skin entire leg with increased redness in skin fold areas along L breast L inguinal and L popliteal. PT noted R lower abdominal hematoma-like area and patient states her PCP is aware and they come and go. She equates them to a \"boil\" but without pus. They do open sometime and leak \"dark blood\". Upper/Lower extremity compression: Applied multi-layer compression bandaging to:     Below knee [x]  Thigh high  [x]   Upper Extremity []    Right []   Left [x]    Bilateral []    The following multi-layer bandages were applied:  []  Tricofix            []  Silver Stockinette             [x]  Tg soft  []  Comperm    []  Transelast     Padding layer:  []  Cellona         [x]  Fleece x 2 lower leg \"bulk out\" ankle    Foam:  [x] 10cm roll  [x] 12cm roll  [] 15cm roll  [x] Gray foam: under medial knee lobule    [] Komprex  [] Komprex 2    Short stretch bandages:   [] 4cm  [x] 6cm  [x] 8cm  [x] 10cm  [x] 10 x 10cm   [x] 12 x 10cm     Educated patient in multi-layer bandaging donning principles and precautions. Gait [id number] 10 Minutes  Today with the full leg MLB increased lateral sway during gait with SPC and increased c/o knee pain. PT suggesting to use a RW vs SPC and performed a trial here in the clinic with a bariatric standard walker. Increased WB through hands alleviated WB through left knee with subjective improvement in pain level. Noted less lateral trunk sway. Recommending RW for home/community use when full leg bandaged   Girth/Volume measurement: See below     Circumferential Limb Measurements:  Affected Side: B/L   Left (cm) Right (cm)   5th Tuberosity 23.5           Ankle 26.7           Calf 52           Mid Knee 79.5           Thigh 98.9           Groin 104           Length               Observation:        TOTAL TREATMENT 80 mins     Circumferential Limb Measurements:  Affected Side: B/L   Left (cm) Right (cm)   5th Tuberosity 23.5           Ankle 26.7           Calf 52           Mid Knee 79.5           Thigh 98.9           Groin 104           Length                 ASSESSMENT:   Treatment effectiveness and tolerance: Patient tolerated today's treatment well with continued MLD and due to no c/o with previous MLB decision to progress to full leg compression today. Patient required additional support from standard walker in order to ambulate with full leg MLB vs SPC. Increased subjective pain c/o when donned however patient willing to try and tolerate for a few hours. Educated patient if she needs to remove she can take the MLB down to just a below knee MLB. Due to size of thigh with MLB on she will not be able to have both LE's bandaged at the same time as this would impact her functional mobility too greatly. Dermatology appointment pending. F/U scheduled for next week regarding R abdominal \"blood boil\".        Medial knee lobule very fibrotic and with papillomas. Open wound posterior Left thigh treated per above. Spouse present today to observe treatment especially the below bandaging techniques. We discussed next visit to have hands on education. Patient is in agreement and very motivated to work on her lymphedema. Progress toward goals: Initiated     PLAN OF CARE:   Changes to the plan of care: 1. Benefits check for Flexitouch pump, eval faxed to [employer]  2. Re-assess tolerance to thigh high L LE as patient tolerates in 1-2 visits and teach spouse how to bandage a below knee. 3.  Patient to purchase a Bariatric RW for safety with ambulation when using full leg MLB.    Frequency: [x]  2 times a week  []  Weekly  []  Biweekly  []  Monthly   Other: Bodyworks compression: self pay bandages and post op shoe       LISA ChaudhariT

## 2019-11-18 ENCOUNTER — HOSPITAL ENCOUNTER (OUTPATIENT)
Dept: PHYSICAL THERAPY | Age: 64
Discharge: HOME | End: 2019-11-18
Payer: OTHER GOVERNMENT

## 2019-11-18 PROCEDURE — 97140 MANUAL THERAPY 1/> REGIONS: CPT

## 2019-11-18 PROCEDURE — 97016 VASOPNEUMATIC DEVICE THERAPY: CPT

## 2019-11-18 NOTE — PROGRESS NOTES
_                                    [facility] [facility]  [address] [facility] [address] [facility]  [address]        LYmphedema Therapy  Visit: 4  3 / 8 tx visits Authorized until 12/9/19      [x]                  Daily note               []                 30 day/10th visit progress note    NAME: Mitzy Locke  DATE: 11/18/2019    GOALS  Short term goals  Time frame: 4  1. Patient will demonstrate knowledge of signs/symptoms of infections/cellulitis and be independent in skin care to prevent cellulitis. 2.  Patient will demonstrate independence in lymphedema home program of therapeutic exercises to improve circulation and decongest limb to improve ADLs. 3.  Patient will tolerate multi-layer bandages (MLB) and show measurable decrease in limb volume to allow ordering of home compression system (daytime, nighttime garments and pump as needed). 4.  Volume reduction by 1000mL L LE.  5.  Spouse will be able to perform below knee MLB with min A in order to improve compliance in compression. 6.  Reduced pain of thigh region 3-4/10 in afternoons with use of compression.     Long term goals  Time frame: 8  1. Pt will show improvement in Lymphedema Life Impact Scale by decreasing impairment percentage to under 65% and thus allow improvement in patient's quality of life. 2.  Patient will be independent with don/doff of compression system and use in order to prevent re accumulation of fluid at discharge. 3.  Pt will be independent in self-MLD and show stable limb volumes showing decongestion and pt. ready for transition to independent restorative phase of lymphedema therapy. 4.  Patient with volume reduction by 2,000 from evaluation       SUBJECTIVE REPORT:  Patient arrives with lower leg still bandage with reports of bandage becoming loose on the thigh and falling down. Her and her spouse attempted to re bandage and it again only last 1 night.  She reports the thigh was softer after coming out of bandages and she could tell it was working. She also reports very painful over her knee and with great difficulty getting in/out of the car while wearing the full leg bandage. Patient's stating she tried to tolerate the thigh knee bandage as much as possible but it really caused a lot of knee discomfort due to her knee OA. Reports she is doing her own wound care and has an appointment to see Dermatology and her PCP tomorrow to address wound and abdominal sore. Patient will bring in copy of operative report to see if lymph nodes removed in abdominal region. Pain:7-8/10 Left knee with compression on. Reports heaviness and pain and declines further MLB above the knee today due to dermatology appointment              Gait:    [x]  Independent gait without any assistive device for community distances premorbidly:  Recommended bariatric standard/2 wheeled RW for ambulation mod I. ADLs:    Treatment Response:    [x]   Patient reviewed packet received at evaluation and asking appropriate questions regarding future POC/compression options/Insurance Auth  []   Patient completed home program as prescribed  []   Patient not fully compliant with home program to date  []   Patient waiting on compression garment arrival  Function: MOd I with assistance from spouse as needed. []   Patient requiring less assistance with completion of home program  []   ADLs are requiring less assistance   []   Patient able to return to work/leisure activities  Lymphedema Life Impact Scale: scores with % impairment  Weight:   TREATMENT AND OBJECTIVE DATA SUMMARY:   Patient/Family Education: Need to f/u with Dermatologist regarding skin fold cream/powder; and the lesion noted on the R abdominal flank (appears like a hematoma) patient describes them as \"boils that never have pus, only blood\" --Patient has scheduled a Derm appointment tomorrow. PT educated patient today on Pump Trial today. Educated in skin care: [x]   Skin care products:  Creams, f/u with dermatology regarding abdominal lesion  []   Hygiene  []  Prevention of cellulitis  []   Wound care   Educated in exercise: [x]   Walking program  []   Lyn ball routine  []   Stick routine  [x]   ROM routine   Instructed in self MLD: Verbal sequence given for opening IA on the left; left inguinal and L upper thigh with upward strokes towards outer left hip. Reviewed with patient as well as demonstration and instruction during MLD portion of the session. Instructed in don/doff of compression system: [x]   Multi layer bandage (MLB) donning principles and wear precautions:  Remove MLB if:  1. Unusual shortness of breath  2. Unrelieved pain despite performance of exercise  3. Uncontrolled itching/discomfort  4. Toes/fingers are blue/cold/numb. Poor capillary refill  5. If bandages are soiled or wet. [x]   Day garments: Will require custom  [x]   Night garments: Will require custom due to stage 3. Considering the [employer] for L and R upper leg       Therapeutic Exercise/Procedure 0 minutes   Treatment time:  ACOL Exercise Routine To date as part of HEP: to perform page 1, and 2. Removed elevated leg series as they are too difficult. Ankle pumps; CW/ CCW ankle and walking to reduce s/s of tightness of MLB.      Stick exercise program: N/A   Free exercises/ROM: N/A   Home program: Patient to perform daily to BID:  [x]   Skin care  [x]   Deep abdominal breathing  [x]   Exercise routine  [x]   Walking program  []   Rest in supine   [x]   Compression bandage  []   Compression garments  []   Vasopneumatic device  []   Wound care  [x]   Self MLD  []   Bring supplies to each therapy visit  []   Purchase necessary supplies  []   Weight loss program  [x]   Follow up with:  Dermatologist regarding R flank \"hematoma/boil\"     Rationale: Exercise will increase the lymph angiomotoricity and tissue pressure of the skin and thus decrease swelling. Modalities 60 minutes   Treatment time: [id number]  Vasopneumatic pump:      Manual Lymphatic Drainage (MLD) 30 minutes   Treatment time: 7030-7824 and [id number]  Area to decongest:  [] LE             []  Right     [x]  Left      [] Trunk    Below knee per patient request due to going to dermatology tomorrow. Sequence used and effectiveness: [x] Secondary/Primary sequence for lower extremities with / without trunk involvement:    [] Modifications were made to manual lymph drainage sequence to exclude cervical techniques secondary to patient's age    [] Self MLD sequence/techniques/hand strokes. Skin Care/Observation:    Wound Properties and Description:  Wound location:  Posterior left thigh  Present on evaluation?: closed at eval, open now  Wound Dimensions: 1.4 cm x 1.2 cm x 0.3mm  Condition of base: clean  Condition of edges: dry, curled  Necrotic to granulation (%):   Depth/Direction of tunnel: none  Depth/Direction of underminin.3mm  Drainage amount: none  Drainage color: n/a  Wound odor: none  Periwound skin condition: healthy  Wound Treatment:   1. Patient performing wound dressing at home. Betadine and wound dressing on top. Removed MLB L below knee, wiped with hygienic wipes. Noted improved skin turgor at ankle with some wrinkling of skin. Dry skin entire leg with increased redness in skin fold areas along L breast L inguinal and L popliteal. PT noted R lower abdominal hematoma-like area and patient states her PCP is aware and they come and go. She equates them to a \"boil\" but without pus. They do open sometime and leak \"dark blood\". See's dermatology tomorrow. Improved appearance. Less raised and more flat. Upper/Lower extremity compression: Applied multi-layer compression bandaging to:     Below knee [x]  Thigh high  []   Upper Extremity []    Right []   Left [x]    Bilateral []    The following multi-layer bandages were applied:  []  Tricofix            []  Silver Stockinette             [x]  Tg soft  []  Comperm    []  Transelast     Padding layer:  [x]  Cellona         [x]  Fleece x 1 lower leg \"bulk out\" ankle    Foam:  [x] 10cm roll  [] 12cm roll  [] 15cm roll  [] Gray foam:   [] Komprex  [] Komprex 2    Short stretch bandages:   [] 4cm  [x] 6cm  [x] 8cm  [x] 10cm  [] 10 x 10cm   [] 12 x 10cm     Educated patient in multi-layer bandaging donning principles and precautions. Prosthetic Training: Demonstration/education of compression options that would apply to her shape and size:  1. Custom full leg Circ Aid   2. Medi assist for night paired with below knee custom knee highs     Girth/Volume measurement: See below     Observation:        TOTAL TREATMENT 90 mins       ASSESSMENT:   Treatment effectiveness and tolerance: Flexitouch pump trial performed today in clinic with [employer] [facility]. Patient reporting great difficulty with full leg compression since last visit due to increase knee pain and decreased functional mobility due to difficulty lifting and bending the knee to get in and out of car and decreased mobility. PT discussed other compression options available to her to address medial knee lobules and thighs. PT suggesting custom Circ Aid compression which can be worn day or night or the Medi Assist. Both have Velcro and adjustability which will allow her to adjust due to discomfort or task. Patient reporting spouse will be able to help her \"at times\" to bandage but not all the time due to his own health concerns (High BP, Vertigo). Patient really wanting to be Indep with a compression system. She is very pleased with the softening she did note with just a day or two using the thigh compression. Patient declining both below knee's to be bandaged today due to having a dermatology and PCP appointment tomorrow. Continues to have significant sized medial knee lobule which are very fibrotic, heavy, and with papillomas.  Open wound posterior Left thigh treated per above. Due to the patient's size and condition she will need additional visits to address all her impairments and functional limitations. Her condition is compounded by a possible lipo- lymphedema component due to the size and shape of her foot and ankle compared to thigh/knee. This combined with possible lymph node resection due to endometrial cancer surgery impacts progress and directly affects her  treatment time. In addition, she has an open wound on the L posterior upper leg and is at risk for further wounds an infections if left untreated. Progress toward goals: Initiated     PLAN OF CARE:   Changes to the plan of care: 1. Benefits check for Flexitouch pump, eval faxed to [employer]  2. Consider other  thigh high compression options with patient. Consider bandage liner? Medi Assist LE? Circ Aid? 3.  Progress to b/l Below knee as patient tolerating and possible R Full Leg MLB? 4.  Re-check wound   Frequency: [x]  2 times a week  []  Weekly  []  Biweekly  []  Monthly   Other: Bodyworks compression: self pay bandages and post op shoe       Aubrie Hobbs DPT

## 2019-11-20 ENCOUNTER — HOSPITAL ENCOUNTER (OUTPATIENT)
Dept: PHYSICAL THERAPY | Age: 64
Discharge: HOME | End: 2019-11-20
Payer: OTHER GOVERNMENT

## 2019-11-20 PROCEDURE — 97140 MANUAL THERAPY 1/> REGIONS: CPT

## 2019-11-20 NOTE — PROGRESS NOTES
_                                    [facility] [address] [facility]  [address] [facility]  [address] [address]  [place] [address]        LYmphedema Therapy  Visit: 5  4 / 8 tx visits Authorized until 12/9/19      [x]                  Daily note               []                 30 day/10th visit progress note    NAME: Silviano Corea  DATE: 11/20/2019    GOALS  Short term goals  Time frame: 4  1. Patient will demonstrate knowledge of signs/symptoms of infections/cellulitis and be independent in skin care to prevent cellulitis. MET 11/20/19  2. Patient will demonstrate independence in lymphedema home program of therapeutic exercises to improve circulation and decongest limb to improve ADLs. 3.  Patient will tolerate multi-layer bandages (MLB) and show measurable decrease in limb volume to allow ordering of home compression system (daytime, nighttime garments and pump as needed). 4.  Volume reduction by 1000mL L LE.  5.  Spouse will be able to perform below knee MLB with min A in order to improve compliance in compression. 6.  Reduced pain of thigh region 3-4/10 in afternoons with use of compression.     Long term goals  Time frame: 8  1. Pt will show improvement in Lymphedema Life Impact Scale by decreasing impairment percentage to under 65% and thus allow improvement in patient's quality of life. 2.  Patient will be independent with don/doff of compression system and use in order to prevent re accumulation of fluid at discharge. 3.  Pt will be independent in self-MLD and show stable limb volumes showing decongestion and pt. ready for transition to independent restorative phase of lymphedema therapy. 4.  Patient with volume reduction by 2,000 from evaluation       SUBJECTIVE REPORT: Patient arrives with bandage on L LE. Reports saw her PCP and Dermatologist and wound area was cultured and results pending.  New cream to apply to wound area (Mupirocin2%) and abdominal \"boil\" area (Clindamycin Phosphate 1%). Patient also placed on oral Doxyclinine 100Mg 2x/day for 14 days. Patient stating she's unable to tolerate the full leg compression wrap over th knee due to high pain. Patient states she is going to have cortisone injections. Pain:7-8/10 Left knee               Gait:    [x] Moderate Independent gait with SPC assistive device for community distances premorbidly:  Using SPC while being bandaged    ADLs:    Treatment Response:    [x]   Patient reviewed packet received at evaluation and asking appropriate questions regarding future POC/compression options/Insurance Auth  []   Patient completed home program as prescribed  []   Patient not fully compliant with home program to date  []   Patient waiting on compression garment arrival  Function: MOd I with assistance from spouse as needed. []   Patient requiring less assistance with completion of home program  []   ADLs are requiring less assistance   []   Patient able to return to work/leisure activities  Lymphedema Life Impact Scale: scores with % impairment  Weight:   TREATMENT AND OBJECTIVE DATA SUMMARY:   Patient/Family Education: PT educated patient on need to compress R LE and reduce prior to ordering garments. Educated in skin care: [x]   Skin care products:  Creams, f/u with dermatology regarding abdominal lesion  []   Hygiene  []  Prevention of cellulitis  []   Wound care   Educated in exercise: [x]   Walking program  []   Lyn ball routine  []   Stick routine  [x]   ROM routine   Instructed in self MLD: Verbal sequence given for opening IA on the left; left inguinal and L upper thigh with upward strokes towards outer left hip. Reviewed with patient as well as demonstration and instruction during MLD portion of the session. Instructed in don/doff of compression system: [x]   Multi layer bandage (MLB) donning principles and wear precautions:  Remove MLB if:  1. Unusual shortness of breath  2.   Unrelieved pain despite performance of exercise  3. Uncontrolled itching/discomfort  4. Toes/fingers are blue/cold/numb. Poor capillary refill  5. If bandages are soiled or wet. [x]   Day garments: Will require custom  [x]   Night garments: Will require custom due to stage 3. Considering the [employer] for L and R upper leg       Therapeutic Exercise/Procedure 0 minutes   Treatment time:  ACOL Exercise Routine To date as part of HEP: to perform page 1, and 2. Removed elevated leg series as they are too difficult. Ankle pumps; CW/ CCW ankle and walking to reduce s/s of tightness of MLB. Stick exercise program: N/A   Free exercises/ROM: N/A   Home program: Patient to perform daily to BID:  [x]   Skin care  [x]   Deep abdominal breathing  [x]   Exercise routine  [x]   Walking program  []   Rest in supine   [x]   Compression bandage  []   Compression garments  []   Vasopneumatic device  []   Wound care  [x]   Self MLD  []   Bring supplies to each therapy visit  []   Purchase necessary supplies  []   Weight loss program  [x]   Follow up with:  Dermatologist regarding R flank \"hematoma/boil\"     Rationale: Exercise will increase the lymph angiomotoricity and tissue pressure of the skin and thus decrease swelling. Modalities 0 minutes   Treatment time:  Vasopneumatic pump:      Manual Lymphatic Drainage (MLD) 65 minutes   Treatment time: 1746-8546  Area to decongest:  [x] LE             [x]  Right     [x]  Left      [] Trunk    Below knee per patient request due to unable to tolerate secondary to pain. Sequence used and effectiveness: [x] Secondary/Primary sequence for lower extremities with / without trunk involvement:  Diaphragmatic breathing x 10 pre and post; Short neck;  B/L Axillary ln; B/L Open IA; B/L inguinal ln; LE primary sequence performed on both LE's.     [] Modifications were made to manual lymph drainage sequence to exclude cervical techniques secondary to patient's age    [] Self MLD sequence/techniques/hand strokes. Skin Care/Observation:    Wound Properties and Description:  Wound location:  Posterior left thigh  Present on evaluation?: closed at eval, open now  Wound Dimensions: 1.4 cm x 1.2 cm x 0.3mm  Condition of base: clean  Condition of edges: dry, curled  Necrotic to granulation (%):   Depth/Direction of tunnel: none  Depth/Direction of underminin.3mm  Drainage amount: none  Drainage color: n/a  Wound odor: none  Periwound skin condition: healthy  Wound Treatment:   1. Cleaned surrounding skin with alcohol prep pad  2. Applied provided ointment MuPirocin2% and covered with Mepilex AG 2x2 border. Removed MLB L below knee, wiped with hygienic wipes. Noted improved skin turgor at ankle with some wrinkling of skin. Dry skin entire leg with increased redness in skin fold areas along L breast L inguinal and L popliteal. PT noted R lower abdominal hematoma-like area and patient states her PCP/Dermatologist are aware and pending culture. Topical creams provided today to apply to wound area. Below knee areas with better skin turgor noted L LE with R LE full and taut with pink hue. Upper/Lower extremity compression: Applied multi-layer compression bandaging to: Below knee [x]  Thigh high  []   Upper Extremity []    Right [x]   Left [x]    Bilateral [x]    The following multi-layer bandages were applied:  []  Tricofix            []  Silver Stockinette             [x]  Tg soft  []  Comperm    []  Transelast     Padding layer:  [x]  Cellona  X 1 each LE  [x]  Fleece x 1 lower leg \"bulk out\" ankle    Foam:  [x] 10cm roll x 2  [] 12cm roll  [] 15cm roll  [] Gray foam:   [] Komprex  [] Komprex 2    Short stretch bandages:   [] 4cm  [x] 6cm x 2  [x] 8cm x 2  [x] 10cm x 2  [] 10 x 10cm   [] 12 x 10cm     Educated patient in multi-layer bandaging donning principles and precautions.      Prosthetic Training: Demonstration/education of compression options that would apply to her shape and size:  1. Custom full leg Circ Aid   2. Medi assist for night paired with below knee custom knee highs     Girth/Volume measurement: See below       Affected Side: R>L   Left (cm) Right (cm)   5th Tuberosity 24.2    24.9      Ankle 26    30.9      Calf 51    55.5      Mid Knee             Thigh             Groin             Length               Observation:        TOTAL TREATMENT 65 mins       ASSESSMENT:   Treatment effectiveness and tolerance: Progressed to b/l  LE MLD and b/l below knee compression bandages and patient able to demonstrate safe gait with SPC and post op shoe and non skid sock R LE.  L LE demonstrating new hair growth and improved skin turgor. R LE with poor skin turgor very  taut and with a pink hue. Instructed patient to keep MLB's on until next visit however should she need to remove one she is to wash, apply lotion and re-attempt to bandage. She will require assistance from spouse. How to bandage handout provided today. When fully decongested, PT suggesting custom Circ Aid compression which can be worn day or night or the Medi Assist. Both have Velcro and adjustability which will allow her to adjust due to discomfort or task. Patient reporting spouse will be able to help her \"at times\" to bandage but not all the time due to his own health concerns (High BP, Vertigo). Patient really wanting to be Indep with a compression system. She is very pleased with the softening she did note with just a day or two using the thigh compression. Continues to have significant sized medial knee lobule which are very fibrotic, heavy, and with papillomas. Open wound posterior Left thigh treated per above. Due to the patient's size and condition she will need additional visits to address all her impairments and functional limitations. Her condition is compounded by a possible lipo- lymphedema component due to the size and shape of her foot and ankle compared to thigh/knee.   This combined with possible lymph node resection due to endometrial cancer surgery impacts progress and directly affects her  treatment time. In addition, she has an open wound on the L posterior upper leg and is at risk for further wounds an infections if left untreated. Progress toward goals: Initiated     PLAN OF CARE:   Changes to the plan of care: 1. Benefits check for Flexitouch pump, eval faxed to [employer]  2. Consider other  thigh high compression options with patient. Consider bandage liner? Medi Assist LE? Circ Aid? 3.  Progress to b/l Below knee as patient tolerating and possible R Full Leg MLB? 4.  Re-check wound   Frequency: [x]  2 times a week  []  Weekly  []  Biweekly  []  Monthly   Other: Bodyworks compression: self pay bandages and post op shoe       LISA CohenT

## 2019-11-27 ENCOUNTER — HOSPITAL ENCOUNTER (OUTPATIENT)
Dept: PHYSICAL THERAPY | Age: 64
Discharge: HOME | End: 2019-11-27
Payer: OTHER GOVERNMENT

## 2019-11-27 PROCEDURE — 97140 MANUAL THERAPY 1/> REGIONS: CPT

## 2019-11-27 NOTE — PROGRESS NOTES
_                                    [facility] [address] [facility]  [address] [facility]  [address]  [place] [address]        LYmphedema Therapy  Visit: 6  5 / 8 tx visits Authorized until 12/9/19      [x]                  Daily note               []                 30 day/10th visit progress note    NAME: Xu Garay  DATE: 11/27/2019    GOALS  Short term goals  Time frame: 4  1. Patient will demonstrate knowledge of signs/symptoms of infections/cellulitis and be independent in skin care to prevent cellulitis. MET 11/20/19  2. Patient will demonstrate independence in lymphedema home program of therapeutic exercises to improve circulation and decongest limb to improve ADLs. 3.  Patient will tolerate multi-layer bandages (MLB) and show measurable decrease in limb volume to allow ordering of home compression system (daytime, nighttime garments and pump as needed). 4.  Volume reduction by 1000mL L LE.  5.  Spouse will be able to perform below knee MLB with min A in order to improve compliance in compression. 6.  Reduced pain of thigh region 3-4/10 in afternoons with use of compression.     Long term goals  Time frame: 8  1. Pt will show improvement in Lymphedema Life Impact Scale by decreasing impairment percentage to under 65% and thus allow improvement in patient's quality of life. 2.  Patient will be independent with don/doff of compression system and use in order to prevent re accumulation of fluid at discharge. 3.  Pt will be independent in self-MLD and show stable limb volumes showing decongestion and pt. ready for transition to independent restorative phase of lymphedema therapy. 4.  Patient with volume reduction by 2,000 from evaluation       SUBJECTIVE REPORT: Patient reports bandages sliding off the foot towards the toes and she was wondering if something else could be done to make it more secure.    Patient eager to have some sort of compression for her knee/thigh stating its getting bigger and by the middle of the day \"my legs are just aching\" Reports better with elevation but doesn't take away the discomfort. Reports her wound area \"is crusting over\". Patient states she tried self bandaging and \"I did ok, but it came off rather quickly\". Patient applying on her own Mupirocin 2% wound area and to abdominal \"boil\" area (Clindamycin Phosphate 1%). Patient also placed on oral Doxyclinine 100Mg 2x/day for 14 days. Patient stating she's unable to tolerate the full leg compression wrap over th knee due to high pain. Pain:8-9/10 both knees aching at night; now 4/10              Gait:    [x] Moderate Independent gait with SPC assistive device for community distances premorbidly:  Using SPC while being bandaged    ADLs:    Treatment Response:    [x]   Patient reviewed packet received at evaluation and asking appropriate questions regarding future POC/compression options/Insurance Auth  []   Patient completed home program as prescribed  []   Patient not fully compliant with home program to date  []   Patient waiting on compression garment arrival  Function: MOd I with assistance from spouse as needed. []   Patient requiring less assistance with completion of home program  []   ADLs are requiring less assistance   []   Patient able to return to work/leisure activities  Lymphedema Life Impact Scale: scores with % impairment  Weight:   TREATMENT AND OBJECTIVE DATA SUMMARY:   Patient/Family Education: Lou Butler thigh compression options with handout. Sizing up to 3x to be paired with daytime custom compression knee highs.  Patient to purchase if she can    Where to purchase hip[employer] Group ball for medial knee region    S/s of infection due to increased pink hue noted medial knee lobules     Educated in skin care: [x]   Skin care products:  Creams, f/u with dermatology regarding abdominal lesion  []   Hygiene  []  Prevention of cellulitis  []   Wound care Educated in exercise: [x]   Walking program  []   Lyn ball routine  []   Stick routine  [x]   ROM routine   Instructed in self MLD: Verbal sequence given for opening IA on the left; left inguinal and L upper thigh with upward strokes towards outer left hip. Reviewed with patient as well as demonstration and instruction during MLD portion of the session. Instructed in don/doff of compression system: [x]   Multi layer bandage (MLB) donning principles and wear precautions:  Remove MLB if:  1. Unusual shortness of breath  2. Unrelieved pain despite performance of exercise  3. Uncontrolled itching/discomfort  4. Toes/fingers are blue/cold/numb. Poor capillary refill  5. If bandages are soiled or wet. [x]   Day garments: Will require custom  [x]   Night garments: Will require custom due to stage 3. Considering the [employer] for L and R upper leg       Therapeutic Exercise/Procedure 0 minutes   Treatment time:  ACOL Exercise Routine To date as part of HEP: to perform page 1, and 2. Removed elevated leg series as they are too difficult. Ankle pumps; CW/ CCW ankle and walking to reduce s/s of tightness of MLB. Stick exercise program: N/A   Free exercises/ROM: N/A   Home program: Patient to perform daily to BID:  [x]   Skin care  [x]   Deep abdominal breathing  [x]   Exercise routine  [x]   Walking program  []   Rest in supine   [x]   Compression bandage  []   Compression garments  []   Vasopneumatic device  []   Wound care  [x]   Self MLD  []   Bring supplies to each therapy visit  []   Purchase necessary supplies  []   Weight loss program  [x]   Follow up with:  Dermatologist regarding R flank \"hematoma/boil\"     Rationale: Exercise will increase the lymph angiomotoricity and tissue pressure of the skin and thus decrease swelling.      Modalities 0 minutes   Treatment time:  Vasopneumatic pump:      Manual Lymphatic Drainage (MLD) 77 minutes   Treatment time: 7964-1794  Area to decongest:  [x] LE             [x]  Right     [x]  Left      [] Trunk    Below knee per patient request due to unable to tolerate secondary to pain. Sequence used and effectiveness: [x] Secondary/Primary sequence for lower extremities with / without trunk involvement:  Diaphragmatic breathing x 10 pre and post; Short neck; B/L Axillary ln; B/L Open IA; B/L inguinal ln; LE primary sequence performed on both LE's.     [] Modifications were made to manual lymph drainage sequence to exclude cervical techniques secondary to patient's age    [] Self MLD sequence/techniques/hand strokes. Skin Care/Observation:    Wound Properties and Description:  Wound location:  Posterior left thigh  Present on evaluation?: closed at eval, open now  Wound Dimensions: 1.4 cm x 1.2 cm x 0.3mm  Condition of base: clean  Condition of edges: dry, curled  Necrotic to granulation (%):   Depth/Direction of tunnel: none  Depth/Direction of underminin.3mm  Drainage amount: none  Drainage color: n/a  Wound odor: none  Periwound skin condition: healthy  Wound Treatment:   Patient performing wound care per MD recommendations at home. Removed MLB L below knee, wiped with hygienic wipes. Noted improved skin turgor at ankle with some wrinkling of skin. Dry skin entire leg with increased redness in skin fold areas along L breast L inguinal and L popliteal. PT noted R lower abdominal hematoma improving and now smooth. Discoloration still present and deep purple/pink color.(PCP/Dermatologist are aware and pending culture)    B/L Medial knee lobules hard with multiple areas of papillomas. L with increasing pink hue. Karin Jacobs Upper/Lower extremity compression: Applied multi-layer compression bandaging to:     Below knee [x]  Thigh high  []   Upper Extremity []    Right [x]   Left [x]    Bilateral [x]    The following multi-layer bandages were applied:  []  Tricofix            []  Silver Stockinet             [x]  Tg soft  []  Comperm    []  Transelast     Padding layer:  [x]  Cellona  X 1 each LE  [x]  Fleece x 1 lower leg \"bulk out\" ankle    Foam:  [x] 10cm roll x 2  [] 12cm roll  [] 15cm roll  [] Gray foam:   [] Komprex  [] Komprex 2    Short stretch bandages:   [] 4cm  [x] 6cm x 2  [x] 8cm x 2  [x] 10cm x 2  [x] 12 x 5cm   Coban on foot to aid in keeping bandage on the foot. Educated patient in multi-layer bandaging donning principles and precautions. Prosthetic Training/Orthotic Fit and Mgt: Preliminary measurements taken for b/l below knee custom knee highs by [employer]. Girth/Volume measurement: See below       Affected Side: R>L   Left (cm) Right (cm)   5th Tuberosity 23.6    23.7      Ankle 27.3    26.2      Calf 55.5    57      Mid Knee 82.6    79.7      Thigh 95    94      Groin 102.5    102      Length               TOTAL TREATMENT 77 mins       ASSESSMENT:   Treatment effectiveness and tolerance: Preliminary custom knee high measurements taken however still could be decongested at the calf region due to self bandage technique and not getting bandage high enough up. PT suggesting to purchase Merena high waisted Monica to be utilized during the day with custom knee highs once she is ready for that phase. Patient with concerns that these will fit over her knee lobules. PT instructing her to find out what the return policy is prior to purchasing. This will not be adequate compression for the thigh but it will help. She will require b/l Medi Assist and the pump to address the LE's due to size, shape, and severity of condition. Continued B/L MLB only below knees. Instructed patient to keep MLB's on or re wrap as needed until next visit because we will try and measure for equipment next time. When fully decongested, PT recommending b/l Medi Assist.  Patient really wanting to be Indep with a compression system. Continues to have significant sized medial knee lobule which are very fibrotic, heavy, and with papillomas.  Open wound posterior Left thigh treated per above. Patient expressing interest in [employer] weight loss program and PT encouraging. PT also recommending aquatic exercise once wound area fully closed. Progress toward goals: Initiated     PLAN OF CARE:   Changes to the plan of care: 1. Measure for B/L Medi Assist  2. Re-measure/verify custom knee high measurements. 3.  Cont with  b/l Below knee MLB until garments arrive.   4. Re-check wound   Frequency: [x]  2 times a week  []  Weekly  []  Biweekly  []  Monthly   Other: Bodyworks compression: self pay bandages and post op shoe       Aubrie Anand, DPT

## 2019-12-04 ENCOUNTER — HOSPITAL ENCOUNTER (OUTPATIENT)
Dept: PHYSICAL THERAPY | Age: 64
Discharge: HOME | End: 2019-12-04
Payer: OTHER GOVERNMENT

## 2019-12-04 PROCEDURE — 97140 MANUAL THERAPY 1/> REGIONS: CPT

## 2019-12-04 NOTE — PROGRESS NOTES
_                                    [facility] [address] [facility]  [address] [facility]  [address]  [place] [address]        LYmphedema Therapy  Visit Total: 7  6 / 8 tx visits Authorized until 12/9/19      [x]                  Daily note               []                 30 day/10th visit progress note    NAME: Damion Johns  DATE: 12/4/2019    GOALS  Short term goals  Time frame: 4  1. Patient will demonstrate knowledge of signs/symptoms of infections/cellulitis and be independent in skin care to prevent cellulitis. MET 11/20/19  2. Patient will demonstrate independence in lymphedema home program of therapeutic exercises to improve circulation and decongest limb to improve ADLs. 3.  Patient will tolerate multi-layer bandages (MLB) and show measurable decrease in limb volume to allow ordering of home compression system (daytime, nighttime garments and pump as needed). 12/4/19  Partially met. Only able to tolerate bilateral below knee MLB. 4.  Volume reduction by 1000mL L LE.  5.  Spouse will be able to perform below knee MLB with min A in order to improve compliance in compression. 12/4/19 partially met. Spouse assists patient with self MLB. 6.  Reduced pain of thigh region 3-4/10 in afternoons with use of compression.     Long term goals  Time frame: 8  1. Pt will show improvement in Lymphedema Life Impact Scale by decreasing impairment percentage to under 65% and thus allow improvement in patient's quality of life. 2.  Patient will be independent with don/doff of compression system and use in order to prevent re accumulation of fluid at discharge. 3.  Pt will be independent in self-MLD and show stable limb volumes showing decongestion and pt. ready for transition to independent restorative phase of lymphedema therapy.   4.  Patient with volume reduction by 2,000 from evaluation       SUBJECTIVE REPORT:  Patient stating culture came back on the wound and was positive for a bacteria (unable to remember the name). Patient already taking antibiotics. Patient able to maintain below knee MLB's on over the holiday with spouse assistance. Patient is worried that she is not being compressed above knees and continues to c/o knee pain. Patient states she purchased a size 4x full OTC compression leggings for future use with knee highs. Its a non returnable item. Patient considering returning it since PT had originally suggested a liat. Patient applying on her own Mupirocin 2% wound area and to abdominal \"boil\" area (Clindamycin Phosphate 1%). Patient also placed on oral Doxyclinine 100Mg 2x/day for 14 days. Patient stating she's unable to tolerate the full leg compression wrap over th knee due to high pain. Pain:8-9/10 both knees aching at night; now 4/10              Gait:    [x] Moderate Independent gait with SPC assistive device for community distances premorbidly:  Using SPC while being bandaged    ADLs:    Treatment Response:    [x]   Patient reviewed packet received at evaluation and asking appropriate questions regarding future POC/compression options/Insurance Auth  []   Patient completed home program as prescribed  []   Patient not fully compliant with home program to date  []   Patient waiting on compression garment arrival  Function: MOd I with assistance from spouse as needed. []   Patient requiring less assistance with completion of home program  []   ADLs are requiring less assistance   []   Patient able to return to work/leisure activities  Lymphedema Life Impact Scale: scores with % impairment  Weight:   TREATMENT AND OBJECTIVE DATA SUMMARY:   Patient/Family Education: Frost[employer] Daily thigh compression options with handout. Sizing up to 3x to be paired with daytime custom compression knee highs. Patient to purchase if she can.     S/s of infection due to increased pink hue noted medial knee lobules     Educated in skin care: [x]   Skin care products: Creams, f/u with dermatology regarding abdominal lesion  []   Hygiene  []  Prevention of cellulitis  []   Wound care   Educated in exercise: [x]   Walking program  []   Lyn ball routine  []   Stick routine  [x]   ROM routine   Instructed in self MLD: Verbal sequence given for opening IA on the left; left inguinal and L upper thigh with upward strokes towards outer left hip. Reviewed with patient as well as demonstration and instruction during MLD portion of the session. Instructed in don/doff of compression system: [x]   Multi layer bandage (MLB) donning principles and wear precautions:  Remove MLB if:  1. Unusual shortness of breath  2. Unrelieved pain despite performance of exercise  3. Uncontrolled itching/discomfort  4. Toes/fingers are blue/cold/numb. Poor capillary refill  5. If bandages are soiled or wet. [x]   Day garments: Will require custom  [x]   Night garments: Will require custom due to stage 3. Considering the [employer] for L and R upper leg       Therapeutic Exercise/Procedure 0 minutes   Treatment time:  ACOL Exercise Routine To date as part of HEP: to perform page 1, and 2. Removed elevated leg series as they are too difficult. Ankle pumps; CW/ CCW ankle and walking to reduce s/s of tightness of MLB. Stick exercise program: N/A   Free exercises/ROM: N/A   Home program: Patient to perform daily to BID:  [x]   Skin care  [x]   Deep abdominal breathing  [x]   Exercise routine  [x]   Walking program  []   Rest in supine   [x]   Compression bandage  []   Compression garments  []   Vasopneumatic device  []   Wound care  [x]   Self MLD  []   Bring supplies to each therapy visit  []   Purchase necessary supplies  []   Weight loss program  [x]   Follow up with:  Dermatologist regarding R flank \"hematoma/boil\"     Rationale: Exercise will increase the lymph angiomotoricity and tissue pressure of the skin and thus decrease swelling.      Modalities 0 minutes   Treatment time:  Vasopneumatic pump:      Manual Lymphatic Drainage (MLD) 85 minutes   Treatment time: 0286-9752  Area to decongest:  [x] LE             [x]  Right     [x]  Left      [] Trunk    Below knee per patient request due to unable to tolerate full thigh high bandage secondary to knee pain. Sequence used and effectiveness: Defer MLD today due to time spent on custom measurements. [] Secondary/Primary sequence for lower extremities with / without trunk involvement:  Diaphragmatic breathing x 10 pre and post; Short neck; B/L Axillary ln; B/L Open IA; B/L inguinal ln; LE primary sequence performed on both LE's.     [] Modifications were made to manual lymph drainage sequence to exclude cervical techniques secondary to patient's age    [] Self MLD sequence/techniques/hand strokes. Skin Care/Observation:    Wound Properties and Description:  Wound location:  Posterior left thigh  Present on evaluation?: closed at eval, scab now  Wound Dimensions: 1.0 cm x 1.0 cm   Condition of base: clean  Condition of edges: dry  Necrotic to granulation (%): 5/95  Depth/Direction of tunnel: none  Depth/Direction of undermining: n/a  Drainage amount: none  Drainage color: n/a  Wound odor: none  Periwound skin condition: healthy  Wound Treatment:   Patient performing wound care per MD recommendations at home. Removed MLB L below knee, wiped with hygienic wipes. Noted improved skin turgor at ankles with some wrinkling of skin. Dry skin entire leg with increased redness in skin fold areas along L breast L inguinal and L popliteal. PT noted R lower abdominal hematoma improving and now smooth. Discoloration still present and deep purple/pink color.(PCP/Dermatologist are aware and pending culture)    B/L Medial knee lobules hard with multiple areas of papillomas. L with increasing pink hue. Tresea Abdi Upper/Lower extremity compression: Applied multi-layer compression bandaging to:     Below knee [x]  Thigh high  []   Upper Extremity []    Right [x]   Left [x]    Bilateral [x]    The following multi-layer bandages were applied:  []  Tricofix            []  Silver Stockinet             [x]  Tg soft  []  Comperm    []  Transelast     Padding layer:  [x]  Cellona  X 1 each LE  [x]  Fleece x 1 lower leg \"bulk out\" ankle    Foam:  [x] 10cm roll x 2  [] 12cm roll  [] 15cm roll  [] Gray foam:   [] Komprex  [] Komprex 2    Short stretch bandages:   [] 4cm  [x] 6cm x 2  [x] 8cm x 2  [x] 10cm x 2  [x] 12 x 5cm   Coban on foot to aid in keeping bandage on the foot. Educated patient in multi-layer bandaging donning principles and precautions. Increased time spent on taking custom measurements for day and night time compression devices:    1.  J[employer] Custom  b/l below knee  2. Sigvaris  L LE medi lobule assist compression system. Measurements and pictures faxed to vendor for procurement. Girth/Volume measurement: See below     Observation:    Left Medial   View            Left Anterior View      Left Lateral View    Left Posterior View        TOTAL TREATMENT 85 mins       ASSESSMENT:   Treatment effectiveness and tolerance: Below knee areas are improving in size and skin condition is improved with compression. Skin turgor better with less pink hue at ankle region. Note wrinkles dorsal ankle. Patient unable to tolerate full thigh MLB per previous attempts and will need decongestion here via a night time compression garment and with use of a Flexitouch Pump. She was measured today for below knee custom stockings and for a sigvaris lobule assist on the left LE. Decision made to only order 1 set of stockings and the left lobule system to ensure proper fit and effectiveness prior to ordering the rest of what she requires. She will need 2 sets of custom daytime and 1 custom medi lobule assist compression system for each LE as they are very different in shape and size.         Patient is going to purchase a \"support\" style daytime liat (Like Cerevo) to be utilized during the day with custom knee highs as something to aid compression at knee thigh/hip regions. .  However her main decongestion of the thigh lobules will come from the nighttime use of the Sigvaris Medi Lobule assist system and Flexitouch pump. Together they will aid reduction and re-shaping of the above knee areas  She will require b/l Medi Assist and the pump to address the LE's due to size, shape, and severity of condition. Continued B/L MLB only below knees. Instructed patient to keep MLB's on or re wrap as needed until next visit. Continues to have significant sized medial knee lobule which are very fibrotic, heavy, and with papillomas. Open wound posterior Left thigh is scabbing over and healing appropriately with  treated per above. Patient has previously expressed interest in [employer] weight loss program and PT encouraging. PT also recommending aquatic exercise once wound area fully closed. Progress toward goals: Updated today     PLAN OF CARE:   Changes to the plan of care: 1. Fax measurements to vendor for insurance Auth/procurement of custom day and night time garments. 2.  3.  Cont with  b/l Below knee MLB until garments arrive. 3.  Continue low sodium diet  4. Fax PN to [employer] for pump procurement after next visit.      Frequency: [x]  2 times a week  []  Weekly  []  Biweekly  []  Monthly   Other: Bodyworks compression: self pay bandages and post op shoe       Aubrie Matute, LISAT

## 2019-12-06 ENCOUNTER — HOSPITAL ENCOUNTER (OUTPATIENT)
Dept: PHYSICAL THERAPY | Age: 64
Discharge: HOME | End: 2019-12-06
Payer: OTHER GOVERNMENT

## 2019-12-06 PROCEDURE — 97140 MANUAL THERAPY 1/> REGIONS: CPT

## 2019-12-06 NOTE — PROGRESS NOTES
_                                    [facility] [address] [facility]  [address] [facility]  [address]  [place] [address]        LYmphedema Therapy  Visit: 8  7 / 8 tx visits Authorized until 12/9/19  4 additional visits authorized for use through 12/31/2019      [x]                  Daily note               []                 30 day/10th visit progress note    NAME: Gio Maurer  DATE: 12/6/2019    GOALS  Short term goals  Time frame: 4  1. Patient will demonstrate knowledge of signs/symptoms of infections/cellulitis and be independent in skin care to prevent cellulitis. MET 11/20/19  2. Patient will demonstrate independence in lymphedema home program of therapeutic exercises to improve circulation and decongest limb to improve ADLs. 3.  Patient will tolerate multi-layer bandages (MLB) and show measurable decrease in limb volume to allow ordering of home compression system (daytime, nighttime garments and pump as needed). 4.  Volume reduction by 1000mL L LE.  5.  Spouse will be able to perform below knee MLB with min A in order to improve compliance in compression. 6.  Reduced pain of thigh region 3-4/10 in afternoons with use of compression.     Long term goals  Time frame: 8  1. Pt will show improvement in Lymphedema Life Impact Scale by decreasing impairment percentage to under 65% and thus allow improvement in patient's quality of life. 2.  Patient will be independent with don/doff of compression system and use in order to prevent re accumulation of fluid at discharge. 3.  Pt will be independent in self-MLD and show stable limb volumes showing decongestion and pt. ready for transition to independent restorative phase of lymphedema therapy. 4.  Patient with volume reduction by 2,000 from evaluation       SUBJECTIVE REPORT: Patient reports coban over foot/ankle region of bandaging assisted in keeping bandaging from sliding off of foot.  Patient c/o itching under bandaging. Patient eager to have some sort of compression for her knee/thigh stating its getting bigger and by the middle of the day \"my legs are just aching\" Reports better with elevation but doesn't take away the discomfort. States she is sleeping in her recliner to get her legs elevated at night, reporting this is uncomfortable and impacts her sleep quality. States she likes to roll on her sides in bed and cannot do this in recliner. Patient states she experienced some intermittent tingling in her R hand and questioned if bandaging on her legs being \"too tight\" would cause this. Patient applying on her own Mupirocin 2% wound area and to abdominal \"boil\" area (Clindamycin Phosphate 1%). Patient also placed on oral Doxyclinine 100Mg 2x/day for 14 days. Patient stating she's unable to tolerate the full leg compression wrap over th knee due to high pain. Pain:8-9/10 both knees aching at night; now 4/10              Gait:    [x] Moderate Independent gait with SPC assistive device for community distances premorbidly:  Using SPC while being bandaged    ADLs:    Treatment Response:    [x]   Patient reviewed packet received at evaluation and asking appropriate questions regarding future POC/compression options/Insurance Auth  [x]   Patient completed home program as prescribed  []   Patient not fully compliant with home program to date  []   Patient waiting on compression garment arrival  Function: MOd I with assistance from spouse as needed. []   Patient requiring less assistance with completion of home program  []   ADLs are requiring less assistance   []   Patient able to return to work/leisure activities  Lymphedema Life Impact Scale: scores with % impairment  Weight:   TREATMENT AND OBJECTIVE DATA SUMMARY:   Patient/Family Education: [employer] thigh compression options with handout. Sizing up to 3x to be paired with daytime custom compression knee highs.  Patient to purchase if she can    Where to purchase Nubbi Lyn ball for medial knee region    S/s of infection due to increased pink hue noted medial knee lobules     Educated in skin care: [x]   Skin care products:  Creams, f/u with dermatology regarding abdominal lesion  []   Hygiene  []  Prevention of cellulitis  []   Wound care   Educated in exercise: [x]   Walking program  []   Lyn ball routine  []   Stick routine  [x]   ROM routine   Instructed in self MLD: Verbal sequence given for opening IA on the left; left inguinal and L upper thigh with upward strokes towards outer left hip. Reviewed with patient as well as demonstration and instruction during MLD portion of the session. Instructed in don/doff of compression system: [x]   Multi layer bandage (MLB) donning principles and wear precautions:  Remove MLB if:  1. Unusual shortness of breath  2. Unrelieved pain despite performance of exercise  3. Uncontrolled itching/discomfort  4. Toes/fingers are blue/cold/numb. Poor capillary refill  5. If bandages are soiled or wet. [x]   Day garments: Will require custom  [x]   Night garments: Will require custom due to stage 3. Considering the [employer] for L and R upper leg       Therapeutic Exercise/Procedure 0 minutes   Treatment time:  ACOL Exercise Routine To date as part of HEP: to perform page 1, and 2. Removed elevated leg series as they are too difficult. Ankle pumps; CW/ CCW ankle and walking to reduce s/s of tightness of MLB.      Stick exercise program: N/A   Free exercises/ROM: N/A   Home program: Patient to perform daily to BID:  [x]   Skin care  [x]   Deep abdominal breathing  [x]   Exercise routine  [x]   Walking program  []   Rest in supine   [x]   Compression bandage  []   Compression garments  []   Vasopneumatic device  []   Wound care  [x]   Self MLD  []   Bring supplies to each therapy visit  []   Purchase necessary supplies  []   Weight loss program  [x]   Follow up with:  Dermatologist regarding R flank \"hematoma/boil\"     Rationale: Exercise will increase the lymph angiomotoricity and tissue pressure of the skin and thus decrease swelling. Modalities 0 minutes   Treatment time:  Vasopneumatic pump:      Manual Lymphatic Drainage (MLD) 75 minutes   Treatment time: 12:43-1:58 pm  Area to decongest:  [x] LE             [x]  Right     [x]  Left      [] Trunk    Below knee per patient request due to unable to tolerate secondary to pain. Sequence used and effectiveness: [x] Secondary/Primary sequence for lower extremities with trunk involvement:  Diaphragmatic breathing x 10 pre and post; Short neck; B/L Axillary ln; B/L Open IA; B/L inguinal ln; LE primary sequence performed on both LE's. Fibrosis technique performed bilateral distal medial thighs. [] Modifications were made to manual lymph drainage sequence to exclude cervical techniques secondary to patient's age    [] Self MLD sequence/techniques/hand strokes. Skin Care/Observation:    Wound Properties and Description:  Wound location:  Posterior left thigh  Present on evaluation?: closed at eval, scab now  Wound Dimensions: 1.0 cm x 1.0 cm   Condition of base: clean  Condition of edges: dry  Necrotic to granulation (%): 5/95  Depth/Direction of tunnel: none  Depth/Direction of undermining: n/a  Drainage amount: none  Drainage color: n/a  Wound odor: none  Periwound skin condition: healthy  Wound Treatment:               Patient performing wound care per MD recommendations at home. Removed MLB L below knee, wiped with hygienic wipes. Noted improved skin turgor at ankle with some wrinkling of skin. Dry skin entire leg with increased redness in skin fold areas along bilateral distance medial thigh, L inguinal and L popliteal.  Applied Sarna anti-itch lotion bilateral lower legs/foot prior to bandaging. B/L Medial knee lobules hard with multiple areas of papillomas.  L with increasing pink hue   Upper/Lower extremity compression: Applied multi-layer compression bandaging to: Below knee [x]  Thigh high  []   Upper Extremity []    Right [x]   Left [x]    Bilateral [x]    The following multi-layer bandages were applied:  []  Tricofix            []  Silver Stockinet             [x]  Tg soft  []  Comperm    []  Transelast     Padding layer:  []  Cellona  X 1 each LE  [x]  Fleece x foot/ankle    Foam:  [x] 10cm roll x1  [] 12cm roll  [] 15cm roll  [] Gray foam:   [] Komprex  [] Komprex 2    Short stretch bandages: R/L  [] 4cm  [x] 6cm   [x] 8cm L LE  [x] 10cm x 2 R LE, x 1 L LE  [x] 12 x L LE  Coban on foot/heel to aid in keeping bandage on the foot. Tubigrip applied over tape proximal aspect of bandaging. Patient advised bandaging on lower legs unlikely to contribute to intermittent R hand tingling. Lighter tension used today to bandage LE's, with patient reporting bandaging comfortable at conclusion of treatment today. Educated patient in multi-layer bandaging donning principles and precautions. Compression bandaging instructions:  1. Compression bandaging will be applied twice a week by therapist in clinic, with adjustments to be made at home as indicated if bandaging becomes loose or uncomfortable. 2. If tolerated, remain bandaged between appointments with therapist, removing under the following conditions--DO NOT WAIT FOR A RETURN PHONE CALL FROM CLINIC:    -Numbness/tingling in extremity different from what you have experienced without the bandages in place.  -Compromise in circulation, monitoring blood refill into toes after applying gentle pressure to toes.  -Onset of pain in extremity that is sudden and severe in nature. -Redness, warmth in limb, and/or fever, flu-like symptoms, which may indicate infection. If this occurs, call your doctor right away. If you note a sudden increase in swelling in extremity, with or without redness/warmth, go to the emergency room as soon as possible to have blood clot ruled out.       3. Compression bandaging supplies that can be laundered are the brown compression wraps and any foam applied for padding. Launder in washer/dryer with NO fabric softener, bleach, woolite. Dry on a low heat. 4. Once compression garments are ordered, compression bandaging will be continued until garments arrive for fitting. This process can take several weeks. Prosthetic Training/Orthotic Fit and Mgt: Patient has been measurement for knee high stockings/Medi Assist for L thigh management prior visit. Girth/Volume measurement: See below       Affected Side: R>L   Left (cm) Right (cm)   5th Tuberosity 23.4    22.8      Ankle 25.2    25      Calf 47.4    53.3      Mid Knee             Thigh             Groin             Length               TOTAL TREATMENT 75 mins       ASSESSMENT:   Treatment effectiveness and tolerance: Patient tolerated MLD sequence as noted above. States she is pleased with progress of lower leg/foot lymphedema management. She is anxious to receive news regarding obtaining Flexitouch for home use. Measurements completed for initial compression garments previous visit. She will require b/l Medi Assist and the pump to address the LE's due to size, shape, and severity of condition. Continued B/L MLB only below knees. Instructed patient to keep MLB's on or re wrap as needed until next visit because we will try and measure for equipment next time. When fully decongested, PT recommending b/l Medi Assist.  Patient really wanting to be Indep with a compression system. Continues to have significant sized medial knee lobule which are very fibrotic, heavy, and with papillomas. Open wound posterior Left thigh managed by patient at home per dr instructions. Patient expressing interest in [employer] weight loss program and PT encouraging. PT also recommending aquatic exercise once wound area fully closed.     Discussed with patient the option alternating sleeping in bed/recliner every other night and assess LE lymphedema in the morning to determine if sleeping in recliner is beneficial.  Patient to consider. Still looking for egg crate foam for her bed. Progress toward goals: Ongoing. PLAN OF CARE:   Changes to the plan of care: 1. Measure for B/L Medi Assist  2. Re-measure/verify custom knee high measurements. 3.  Cont with  b/l Below knee MLB until garments arrive.   4. Re-check wound   Frequency: [x]  2 times a week  []  Weekly  []  Biweekly  []  Monthly   Other: Bodyworks compression: self pay bandages and post op shoe       Coy Pelaez, PT, CLT

## 2019-12-09 ENCOUNTER — APPOINTMENT (OUTPATIENT)
Dept: PHYSICAL THERAPY | Age: 64
End: 2019-12-09
Payer: OTHER GOVERNMENT

## 2019-12-11 ENCOUNTER — TELEPHONE (OUTPATIENT)
Dept: PHYSICAL THERAPY | Age: 64
End: 2019-12-11

## 2019-12-11 ENCOUNTER — HOSPITAL ENCOUNTER (OUTPATIENT)
Dept: PHYSICAL THERAPY | Age: 64
Discharge: HOME | End: 2019-12-11
Payer: OTHER GOVERNMENT

## 2019-12-11 PROCEDURE — 97140 MANUAL THERAPY 1/> REGIONS: CPT

## 2019-12-11 NOTE — PROGRESS NOTES
_                                    [address] [facility]  [address] [facility] [address] [facility]  Tete Ar[address]        LYmphedema Therapy  Total # Visit: 9  8 / 8 tx visits Authorized until 12/9/19  4 additional visits authorized for use through 12/31/2019      [x]                  Daily note               []                 30 day/10th visit progress note    NAME: Marj Maldonado  DATE: 12/11/2019    GOALS  Short term goals  Time frame: 4  1. Patient will demonstrate knowledge of signs/symptoms of infections/cellulitis and be independent in skin care to prevent cellulitis. MET 11/20/19  2. Patient will demonstrate independence in lymphedema home program of therapeutic exercises to improve circulation and decongest limb to improve ADLs. 12/11/19 Ongoing. Just purchased [employer]  3. Patient will tolerate multi-layer bandages (MLB) and show measurable decrease in limb volume to allow ordering of home compression system (daytime, nighttime garments and pump as needed). 12/11/19 Partially MET. Only able to tolerate below knee MLB  4.  Volume reduction by 1000mL L LE.   5.  Spouse will be able to perform below knee MLB with min A in order to improve compliance in compression. 12/11/19 Partially MET--assists only at times due to his medical condition. 6.  Reduced pain of thigh region 3-4/10 in afternoons with use of compression. 12/11/19 Partially MET. Reports 0/10 below knee, however unchanged above knee.   Long term goals  Time frame: 8  1. Pt will show improvement in Lymphedema Life Impact Scale by decreasing impairment percentage to under 65% and thus allow improvement in patient's quality of life. 2.  Patient will be independent with don/doff of compression system and use in order to prevent re accumulation of fluid at discharge.   3.  Pt will be independent in self-MLD and show stable limb volumes showing decongestion and pt. ready for transition to independent restorative phase of lymphedema therapy. 4.  Patient with volume reduction by 2,000 from evaluation       SUBJECTIVE REPORT: Patient reports coban over foot/ankle region of bandaging assisted in keeping bandaging from sliding off of foot and asking therapist to continue. Reports no longer on oral antibiotics and wound posterior leg is completely healed. States she is still using the cream on chaffed area of the other leg. Upper leg with continued c/o pain \"aching\" and \"the pain in the lower leg since we've been wrapping is gone. \"    Patient eager to have compression for her knee/thigh region stating its getting bigger and harder with continued c/o  \"my legs are just aching\". She reports using egg crate foam in recliner chair with improvements to posterior legs. Did receive [employer] style pants however she reports they are difficult to get up and down with frequent toileting. Patient applying on her own Mupirocin 2% wound area and to abdominal \"boil\" area (Clindamycin Phosphate 1%). Pain:lower legs 0/10; above knee in the evening 7-8/10              Gait:    [x] Moderate Independent gait with SPC assistive device for community distances premorbidly:  Using SPC while being bandaged. Slow antalgic with WBOS due to size of upper legs and increased trunk sway. Wearing post op shoes b/l. ADLs:    Treatment Response:    [x]   Patient reviewed packet received at evaluation and asking appropriate questions regarding future POC/compression options/Insurance Auth  [x]   Patient completed home program as prescribed  []   Patient not fully compliant with home program to date  []   Patient waiting on compression garment arrival  Function: MOd I with assistance from spouse as needed.   []   Patient requiring less assistance with completion of home program  []   ADLs are requiring less assistance   []   Patient able to return to work/leisure activities  Lymphedema Life Impact Scale: scores with % impairment  Weight:   TREATMENT AND OBJECTIVE DATA SUMMARY:   Patient/Family Education: To contact MD office to expedite the signatures required by [facility] to facilitate the insurance Auth process for custom garments. Educated in skin care: [x]   Skin care products:  Creams, f/u with dermatology regarding abdominal lesion  []   Hygiene  []  Prevention of cellulitis  []   Wound care   Educated in exercise: [x]   Walking program  []   Lyn ball routine  []   Stick routine  [x]   ROM routine   Instructed in self MLD: Verbal sequence given for opening IA on the left; left inguinal and L upper thigh with upward strokes towards outer left hip. Reviewed with patient as well as demonstration and instruction during MLD portion of the session. Instructed in don/doff of compression system: [x]   Multi layer bandage (MLB) donning principles and wear precautions:  Remove MLB if:  1. Unusual shortness of breath  2. Unrelieved pain despite performance of exercise  3. Uncontrolled itching/discomfort  4. Toes/fingers are blue/cold/numb. Poor capillary refill  5. If bandages are soiled or wet. [x]   Day garments: Will require custom  [x]   Night garments: Will require custom due to stage 3. Considering the [employer] for L and R upper leg       Therapeutic Exercise/Procedure 0 minutes   Treatment time:  ACOL Exercise Routine Added:  Lyn ball between knees with hip ADD isometric to encourage lymphatic flow at lobules. Hold 5\" x 10-15 reps 2-3 x/day. To date as part of HEP: to perform page 1, and 2. Removed elevated leg series as they are too difficult. Ankle pumps; CW/ CCW ankle and walking to reduce s/s of tightness of MLB.      Stick exercise program: N/A   Free exercises/ROM: N/A   Home program: Patient to perform daily to BID:  [x]   Skin care  [x]   Deep abdominal breathing  [x]   Exercise routine  [x]   Walking program  []   Rest in supine   [x]   Compression bandage  []   Compression garments  []   Vasopneumatic device  []   Wound care  [x]   Self MLD  []   Bring supplies to each therapy visit  []   Purchase necessary supplies  []   Weight loss program  [x]   Follow up with:  Dermatologist regarding R flank \"hematoma/boil\"     Rationale: Exercise will increase the lymph angiomotoricity and tissue pressure of the skin and thus decrease swelling. Modalities 0 minutes   Treatment time:  Vasopneumatic pump:      Manual Lymphatic Drainage (MLD) 75 minutes   Treatment time: 12: pm  Area to decongest:  [x] LE             [x]  Right     [x]  Left      [] Trunk    Below knee per patient request due to unable to tolerate secondary to pain. Sequence used and effectiveness: [x] Secondary/Primary sequence for lower extremities with trunk involvement:  Diaphragmatic breathing x 10 pre and post; Short neck; B/L Axillary ln; B/L Open IA; B/L inguinal ln; LE primary sequence performed on both LE's. Fibrosis technique performed bilateral distal medial thighs. [] Modifications were made to manual lymph drainage sequence to exclude cervical techniques secondary to patient's age    [] Self MLD sequence/techniques/hand strokes. Skin Care/Observation:   Closed L posterior thigh wound. Noted Dark circular blister like region similar to abdominal regions. Removed MLB L below knee, wiped with hygienic wipes. Noted improved skin turgor at ankle with some wrinkling of skin. Increased petechiae and discoloration along anterior ankle both ankles with increased c/o pain/discomfort from bandages rubbing. Dry skin entire leg with increased redness in skin fold areas along bilateral distance medial thigh, L inguinal and L popliteal.  Applied Sarna anti-itch lotion bilateral lower legs/foot prior to bandaging. B/L Medial knee lobules hard with multiple areas of papillomas. L with increasing pink hue   Upper/Lower extremity compression: Applied multi-layer compression bandaging to:     Below knee [x]  Thigh high  []   Upper Extremity []    Right [x]   Left [x]    Bilateral [x]    The following multi-layer bandages were applied:  []  Tricofix            []  Silver Stockinet             [x]  Tg soft  []  Comperm    []  Transelast     Padding layer:  [x]  Cellona  X 1 each LE  []  Fleece x foot/ankle    Foam:  [x] 10cm roll x1  [x] 12cm roll  [] 15cm roll  [] Gray foam:   [x] Velfoam x 2 with cut out ant ankle to improve comfort  [] Komprex 2    Short stretch bandages: R/L  [] 4cm  [x] 6cm   [x] 8cm L LE  [x] 10cm x 2 R LE, x 1 L LE  [] 12 x L LE  Coban on foot/heel to aid in keeping bandage on the foot. Tubigrip applied over tape proximal aspect of bandaging. Educated patient in multi-layer bandaging donning principles and precautions. Compression bandaging instructions:  1. Compression bandaging will be applied twice a week by therapist in clinic, with adjustments to be made at home as indicated if bandaging becomes loose or uncomfortable. 2. If tolerated, remain bandaged between appointments with therapist, removing under the following conditions--DO NOT WAIT FOR A RETURN PHONE CALL FROM CLINIC:    -Numbness/tingling in extremity different from what you have experienced without the bandages in place.  -Compromise in circulation, monitoring blood refill into toes after applying gentle pressure to toes.  -Onset of pain in extremity that is sudden and severe in nature. -Redness, warmth in limb, and/or fever, flu-like symptoms, which may indicate infection. If this occurs, call your doctor right away. If you note a sudden increase in swelling in extremity, with or without redness/warmth, go to the emergency room as soon as possible to have blood clot ruled out. 3. Compression bandaging supplies that can be laundered are the brown compression wraps and any foam applied for padding. Launder in washer/dryer with NO fabric softener, bleach, woolite. Dry on a low heat.     4. Once compression garments are ordered, compression bandaging will be continued until garments arrive for fitting. This process can take several weeks. Prosthetic Training/Orthotic Fit and Mgt: Patient has been measurement for custom  knee high stockings/Medi Assist for L thigh management prior visit. Girth/Volume measurement: See below     Affected Side: R>L   Left (cm) Right (cm)   5th Tuberosity 22    23      Ankle 25.8    25.3      Calf 55    55.5      Mid Knee 84.5    79      Thigh 98    93.5      Groin 101    105      Length               TOTAL TREATMENT 70 mins       ASSESSMENT:   Treatment effectiveness and tolerance: The patient has received 8 sessions of CDT with good overall tolerance however poor tolerance to compression at the knee due to increased c/o knee pain with poor functional status. Patient has been tolerating below knee bandaging and at this time has been measured for custom knee highs. Shape, and size of upper legs especially medial knee lobules limiting compression options. Medial knee lobules are increasing in density R>L with multiple areas of papillomas and hyperpigmentation noted. Patient has had a Flexitouch trial to address full leg and abdominal compression and is pending authorization. [employer] Medi Lobule assist custom garment has been measured for on the left. The patient has demonstrated good compliance with all aspects of treatment thus far and is eager to have equipment at home to aid in her independent with caring for her condition. She desperately wants to address medial knee region however was unable to tolerate full leg MLB. The medi Assist option will allow her the flexibility to adjust tension as she tolerates to address this region. She will require one custom device for each LE.      Measurements have been completed for initial compression garments in previous visits and we are waiting for insurance authorization.     She will need to remain in below knee compression until garments are procured. Due to significant sized medial knee lobules and need for custom equipment which takes longer to obtain, the patient will need an additional 8 visits. She will need to remain in MLB until garments are manufactured, shipped, and fit by Lymphedema therapist.  If equipment is correct she will wear the garments home. However, she will require follow up visits post fitting to ensure effectiveness and proper fit tolerance. If garments were made incorrectly or deficits noted in any way she would need to resume bandaging until the item is sent back to manufacture, re-made, and re-sent to patient. The can be a lengthy process. She is able to self bandage however Is limited by body habitus to get an effective MLB and her spouse has medical conditions preventing him from assisting. CDT is recommended 2x/week for an additional 4 weeks. Patient expressing interest in [facility] weight loss program and PT encouraging. PT also recommending aquatic exercise once wound area fully closed. Progress toward goals: Updated see above     PLAN OF CARE:   Changes to the plan of care: 1. Patient to contact MD to expedite signature for insurance Auth to continue  2. Re check volumes  3. Continue MLD/MLB  4.   Fit garments upon arrival.   Frequency: [x]  2 times a week  []  Weekly  []  Biweekly  []  Monthly   Other: Bodyworks compression: self pay bandages and post op shoe     Aubrie Villarreal DPT, CLT

## 2019-12-16 ENCOUNTER — APPOINTMENT (OUTPATIENT)
Dept: PHYSICAL THERAPY | Age: 64
End: 2019-12-16
Payer: OTHER GOVERNMENT

## 2019-12-18 ENCOUNTER — HOSPITAL ENCOUNTER (OUTPATIENT)
Dept: PHYSICAL THERAPY | Age: 64
Discharge: HOME | End: 2019-12-18
Payer: OTHER GOVERNMENT

## 2019-12-18 PROCEDURE — 97140 MANUAL THERAPY 1/> REGIONS: CPT

## 2019-12-18 NOTE — PROGRESS NOTES
_                                    [facility] [address] [facility]  [address] [facility]  [address]  [place] [address]        LYmphedema Therapy  Total # Visit: 10    1/4 additional visits authorized for use through 12/31/2019      [x]                  Daily note               []                 30 day/10th visit progress note    NAME: Saint Hahn  DATE: 12/18/2019    GOALS  Short term goals  Time frame: 4  1. Patient will demonstrate knowledge of signs/symptoms of infections/cellulitis and be independent in skin care to prevent cellulitis. MET 11/20/19  2. Patient will demonstrate independence in lymphedema home program of therapeutic exercises to improve circulation and decongest limb to improve ADLs. 12/11/19 Ongoing. Just purchased [employer]  3. Patient will tolerate multi-layer bandages (MLB) and show measurable decrease in limb volume to allow ordering of home compression system (daytime, nighttime garments and pump as needed). 12/11/19 Partially MET. Only able to tolerate below knee MLB  4.  Volume reduction by 1000mL L LE.   5.  Spouse will be able to perform below knee MLB with min A in order to improve compliance in compression. 12/11/19 Partially MET--assists only at times due to his medical condition. 6.  Reduced pain of thigh region 3-4/10 in afternoons with use of compression. 12/11/19 Partially MET. Reports 0/10 below knee, however unchanged above knee.   Long term goals  Time frame: 8  1. Pt will show improvement in Lymphedema Life Impact Scale by decreasing impairment percentage to under 65% and thus allow improvement in patient's quality of life. 2.  Patient will be independent with don/doff of compression system and use in order to prevent re accumulation of fluid at discharge.   3.  Pt will be independent in self-MLD and show stable limb volumes showing decongestion and pt. ready for transition to independent restorative phase of lymphedema therapy. 4.  Patient with volume reduction by 2,000 from evaluation       SUBJECTIVE REPORT:   Patient reports increased b/l UE tingling numbness and L sharp shooting pain in the left leg above and below knee. Reports concerned and removed bandages on Russell L LE only. Increased concerns at ankle region due to tenderness to touch and with sharp shooting pain. Recalls she has cervical disc dysfunction and had an MRI in 2005. Patient with questions regarding insurance/MD Auth of equipment at this time. Patient eager to have compression for her knee/thigh region stating its getting bigger and harder with continued c/o  \"my legs are just aching\". She reports using egg crate foam in recliner chair with improvements to posterior legs. Did receive [employer] style pants however she reports they are difficult to get up and down with frequent toileting. Patient applying on her own Mupirocin 2% wound area and to abdominal \"boil\" area (Clindamycin Phosphate 1%). Pain:lower legs 4/10; sharp shooting pain reported Russell 8/10              Gait:    [x] Moderate Independent gait with SPC assistive device for community distances premorbidly:  Using SPC while being bandaged. Slow antalgic with WBOS due to size of upper legs and increased trunk sway. Wearing post op shoes b/l. ADLs:    Treatment Response:    [x]   Patient reviewed packet received at evaluation and asking appropriate questions regarding future POC/compression options/Insurance Auth  [x]   Patient completed home program as prescribed  []   Patient not fully compliant with home program to date  []   Patient waiting on compression garment arrival  Function: MOd I with assistance from spouse as needed.   []   Patient requiring less assistance with completion of home program  []   ADLs are requiring less assistance   []   Patient able to return to work/leisure activities  Lymphedema Life Impact Scale: scores with % impairment  Weight:   TREATMENT AND OBJECTIVE DATA SUMMARY:   Patient/Family Education: To contact MD office to expedite the signatures required by [employer] [facility] as we've sent 3 faxes and  has sent 2 without response. Discuss with MD new onset B/L UE tingling neck pain and L sharp shooting pain. Concerns for cervical radiculopathy b/l UE and L LE  vs cord compression. Educated in skin care: [x]   Skin care products:  Creams, f/u with dermatology regarding abdominal lesion  []   Hygiene  []  Prevention of cellulitis  []   Wound care   Educated in exercise: [x]   Walking program  []   Lyn ball routine  []   Stick routine  [x]   ROM routine   Instructed in self MLD: Verbal sequence given for opening IA on the left; left inguinal and L upper thigh with upward strokes towards outer left hip. Reviewed with patient as well as demonstration and instruction during MLD portion of the session. Instructed in don/doff of compression system: [x]   Multi layer bandage (MLB) donning principles and wear precautions:  Remove MLB if:  1. Unusual shortness of breath  2. Unrelieved pain despite performance of exercise  3. Uncontrolled itching/discomfort  4. Toes/fingers are blue/cold/numb. Poor capillary refill  5. If bandages are soiled or wet. [x]   Day garments: Will require custom  [x]   Night garments: Will require custom due to stage 3. Considering the [employer] for L and R upper leg       Therapeutic Exercise/Procedure 0 minutes   Treatment time:  ACOL Exercise Routine Added:  Lyn ball between knees with hip ADD isometric to encourage lymphatic flow at lobules. Hold 5\" x 10-15 reps 2-3 x/day. To date as part of HEP: to perform page 1, and 2. Removed elevated leg series as they are too difficult. Ankle pumps; CW/ CCW ankle and walking to reduce s/s of tightness of MLB.      Stick exercise program: N/A   Free exercises/ROM: N/A   Home program: Patient to perform daily to BID:  [x]   Skin care  [x]   Deep abdominal breathing  [x]   Exercise routine  [x]   Walking program  []   Rest in supine   [x]   Compression bandage  []   Compression garments  []   Vasopneumatic device  []   Wound care  [x]   Self MLD  []   Bring supplies to each therapy visit  []   Purchase necessary supplies  []   Weight loss program  [x]   Follow up with:  Dermatologist regarding R flank \"hematoma/boil\"     Rationale: Exercise will increase the lymph angiomotoricity and tissue pressure of the skin and thus decrease swelling. Modalities 0 minutes   Treatment time:  Vasopneumatic pump:      Manual Lymphatic Drainage (MLD) 75 minutes   Treatment time: 12: pm  Area to decongest:  [x] LE             [x]  Right     [x]  Left      [] Trunk    Below knee per patient request due to unable to tolerate secondary to pain. Sequence used and effectiveness: [x] Secondary/Primary sequence for lower extremities with trunk involvement:  Diaphragmatic breathing x 10 pre and post; Short neck; B/L Axillary ln; B/L Open IA; B/L inguinal ln; LE primary sequence performed on both LE's. Fibrosis technique performed bilateral distal medial thighs. Care taken L anterior ankle as very sensitive to touch with MLD avoided dorsal MLD here. Noted mild ecchymosis. [] Modifications were made to manual lymph drainage sequence to exclude cervical techniques secondary to patient's age    [] Self MLD sequence/techniques/hand strokes. Skin Care/Observation:   Closed L posterior thigh wound. Noted Dark circular blister like region similar to abdominal regions. Removed MLB L below knee, wiped with hygienic wipes. Noted improved skin turgor at ankle with some wrinkling of skin. Increased petechiae and discoloration along anterior ankle both ankles with increased c/o pain/discomfort from bandages rubbing R>L. Used Calmoseptine and increased cast padding and less comprilan over ankle to improve comfort.     Dry skin entire leg with increased redness in skin fold areas along bilateral distance medial thigh, L inguinal and L popliteal.  Applied Sarna anti-itch lotion bilateral lower legs/foot prior to bandaging. B/L Medial knee lobules hard with multiple areas of papillomas. L with increasing pink hue   Upper/Lower extremity compression: Applied multi-layer compression bandaging to: Below knee [x]  Thigh high  []   Upper Extremity []    Right [x]   Left [x]    Bilateral [x]    The following multi-layer bandages were applied:  []  Tricofix            []  Silver Stockinet             [x]  Tg soft  []  Comperm    []  Transelast     Padding layer:  [x]  Cellona  X 1 each LE  []  Fleece x foot/ankle    Foam:  [x] 10cm roll x1  [x] 12cm roll  [] 15cm roll  [] Gray foam:   [x] Velfoam x 2 with cut out ant ankle to improve comfort  [] Komprex 2    Short stretch bandages: R/L  [] 4cm  [x] 6cm   [x] 8cm L LE  [x] 10cm x 2 R LE, x 1 L LE  [] 12 x L LE  Coban on foot/heel to aid in keeping bandage on the foot. Tubigrip applied over tape proximal aspect of bandaging. Educated patient in multi-layer bandaging donning principles and precautions. Compression bandaging instructions:  1. Compression bandaging will be applied twice a week by therapist in clinic, with adjustments to be made at home as indicated if bandaging becomes loose or uncomfortable. 2. If tolerated, remain bandaged between appointments with therapist, removing under the following conditions--DO NOT WAIT FOR A RETURN PHONE CALL FROM CLINIC:    -Numbness/tingling in extremity different from what you have experienced without the bandages in place.  -Compromise in circulation, monitoring blood refill into toes after applying gentle pressure to toes.  -Onset of pain in extremity that is sudden and severe in nature. -Redness, warmth in limb, and/or fever, flu-like symptoms, which may indicate infection. If this occurs, call your doctor right away.   If you note a sudden increase in swelling in extremity, with or without redness/warmth, go to the emergency room as soon as possible to have blood clot ruled out. 3. Compression bandaging supplies that can be laundered are the brown compression wraps and any foam applied for padding. Launder in washer/dryer with NO fabric softener, bleach, woolite. Dry on a low heat. 4. Once compression garments are ordered, compression bandaging will be continued until garments arrive for fitting. This process can take several weeks. Prosthetic Training/Orthotic Fit and Mgt: Patient has been measurement for custom  knee high stockings/Medi Assist for L thigh management prior visit. Girth/Volume measurement: See below       TOTAL TREATMENT 75 mins       ASSESSMENT:   Treatment effectiveness and tolerance: Patient to make a f/u appointment with MD regarding new onset B/L UE paraesthesia's and L LE radicular symptoms and h/o cervical degenerative disc. Patient also was given a copy of all documents that require MD signature by insurance and this office for obtaining equipment. Decreased tolerance to L LE compression due to pain 8/10 and anterior ankle discomfort. Applied MLB b/l however made changes to increase padding and decrease compression over anterior ankle to improve patient comfort and compliance with compression. Patient is eager to remain in compression and at end of treatment stating \"it feels ok\" when asked about L ankle. Shape, and size of upper legs especially medial knee lobules limiting compression options. Medial knee lobules are increasing in density R>L with multiple areas of papillomas and hyperpigmentation noted. Patient has had a Flexit[employer] trial to address full leg and abdominal compression and is pending authorization. [employer] Medi Lobule assist custom garment has been measured for on the left.      The patient has demonstrated good compliance with all aspects of treatment thus far and is eager to have equipment at home to aid in her independent with caring for her condition. She desperately wants to address medial knee region however was unable to tolerate full leg MLB. The medi Assist option will allow her the flexibility to adjust tension as she tolerates to address this region. She will require one custom device for each LE.      Measurements have been completed for initial compression garments in previous visits and we are waiting for insurance authorization. She will need to remain in below knee compression until garments are procured. Due to significant sized medial knee lobules and need for custom equipment which takes longer to obtain, the patient will need an additional 8 visits. She will need to remain in MLB until garments are manufactured, shipped, and fit by Lymphedema therapist.  If equipment is correct she will wear the garments home. However, she will require follow up visits post fitting to ensure effectiveness and proper fit tolerance. If garments were made incorrectly or deficits noted in any way she would need to resume bandaging until the item is sent back to manufacture, re-made, and re-sent to patient. The can be a lengthy process. She is able to self bandage however Is limited by body habitus to get an effective MLB and her spouse has medical conditions preventing him from assisting. CDT is recommended 2x/week for an additional 4 weeks. Patient expressing interest in [employer] weight loss program and PT encouraging. PT also recommending aquatic exercise once wound area fully closed. Progress toward goals: Updated see above     PLAN OF CARE:   Changes to the plan of care: 1. Patient to make physical appointment with  MD to expedite signature for insurance Auth and to address new UE/LE pain symptoms per above. 2. Re check volumes  3. Continue MLD/MLB  4.   Fit garments upon arrival.   Frequency: [x]  2 times a week  []  Weekly  []  Biweekly  []  Monthly   Other: Bodyworks compression: self pay bandages and post op shoe Vic Kidney DPT, CLT

## 2019-12-23 ENCOUNTER — APPOINTMENT (OUTPATIENT)
Dept: PHYSICAL THERAPY | Age: 64
End: 2019-12-23
Payer: OTHER GOVERNMENT

## 2019-12-24 ENCOUNTER — HOSPITAL ENCOUNTER (OUTPATIENT)
Dept: PHYSICAL THERAPY | Age: 64
Discharge: HOME | End: 2019-12-24
Payer: OTHER GOVERNMENT

## 2019-12-30 ENCOUNTER — APPOINTMENT (OUTPATIENT)
Dept: PHYSICAL THERAPY | Age: 64
End: 2019-12-30
Payer: OTHER GOVERNMENT

## 2019-12-31 ENCOUNTER — HOSPITAL ENCOUNTER (OUTPATIENT)
Dept: PHYSICAL THERAPY | Age: 64
Discharge: HOME | End: 2019-12-31
Payer: OTHER GOVERNMENT

## 2019-12-31 PROCEDURE — 97140 MANUAL THERAPY 1/> REGIONS: CPT

## 2019-12-31 PROCEDURE — 97530 THERAPEUTIC ACTIVITIES: CPT

## 2019-12-31 NOTE — PROGRESS NOTES
_                                    [address] [facility]  [address] [facility] [address] [facility]  [address]        LYmphedema Therapy  Total # Visit: 11    2/4 additional visits authorized for use through 12/31/2019      [x]                  Daily note               []                 30 day/10th visit progress note    NAME: Hawk Harris  DATE: 12/31/2019    GOALS  Short term goals  Time frame: 4  1. Patient will demonstrate knowledge of signs/symptoms of infections/cellulitis and be independent in skin care to prevent cellulitis. MET 11/20/19  2. Patient will demonstrate independence in lymphedema home program of therapeutic exercises to improve circulation and decongest limb to improve ADLs. 12/11/19 Ongoing. Just purchased [employer]  3. Patient will tolerate multi-layer bandages (MLB) and show measurable decrease in limb volume to allow ordering of home compression system (daytime, nighttime garments and pump as needed). 12/11/19 Partially MET. Only able to tolerate below knee MLB  4.  Volume reduction by 1000mL L LE. Not met, reduced by 289 mL on right and 933 mL on left  5. Spouse will be able to perform below knee MLB with min A in order to improve compliance in compression. 12/11/19 Partially MET--assists only at times due to his medical condition. 6.  Reduced pain of thigh region 3-4/10 in afternoons with use of compression. 12/11/19 Partially MET. Reports 0/10 below knee, however unchanged above knee.     Long term goals  Time frame: 8  1. Pt will show improvement in Lymphedema Life Impact Scale by decreasing impairment percentage to under 65% and thus allow improvement in patient's quality of life. 2.  Patient will be independent with don/doff of compression system and use in order to prevent re accumulation of fluid at discharge.   3.  Pt will be independent in self-MLD and show stable limb volumes showing decongestion and pt. ready for transition to independent restorative phase of lymphedema therapy. 4.  Patient with volume reduction by 2,000 from evaluation       SUBJECTIVE REPORT:   Pt arrives to appt without compression bandages, says she removed them this weekend since she missed appt last week and spouse unable to bandage at home due to his own medical concerns. Patient reports increased b/l UE tingling numbness and L sharp shooting pain in the left leg above and below knee. Increased concerns at ankle region due to tenderness to touch and with sharp shooting pain. Recalls she has cervical disc dysfunction and had an MRI in 2005. Pt says she has appt with orthopedist this Thursday 1/2. Patient eager to have compression for her knee/thigh region stating its getting bigger and harder with continued c/o  \"my legs are just aching\". She reports using egg crate foam in recliner chair with improvements to posterior legs. Did receive Ree Guest style pants however she reports they are difficult to get up and down with frequent toileting. Patient applying on her own Mupirocin 2% wound area and to abdominal \"boil\" area (Clindamycin Phosphate 1%). Pain:lower legs 4/10              Gait:    [x] Moderate Independent gait with SPC assistive device for community distances premorbidly:  Using SPC while being bandaged. Slow antalgic with WBOS due to size of upper legs and increased trunk sway. Wearing post op shoes b/l. ADLs:    Treatment Response:    [x]   Patient reviewed packet received at evaluation and asking appropriate questions regarding future POC/compression options/Insurance Auth  [x]   Patient completed home program as prescribed  []   Patient not fully compliant with home program to date  []   Patient waiting on compression garment arrival  Function: MOd I with assistance from spouse as needed.   []   Patient requiring less assistance with completion of home program  []   ADLs are requiring less assistance   []   Patient able to return to work/leisure activities  Lymphedema Life Impact Scale: scores 48 with 71% impairment (score of 53 at eval)  Weight:   TREATMENT AND OBJECTIVE DATA SUMMARY:   Patient/Family Education: To contact MD office to expedite the signatures [facility] as we've sent 3 faxes and  has sent 2 without response - update: pt says MD signed orders and returned to . Discuss with MD new onset B/L UE tingling neck pain and L sharp shooting pain - has appt 1/2/20 with orthopedist. Concerns for cervical radiculopathy b/l UE and L LE vs cord compression. Educated in skin care: [x]   Skin care products:  Creams, f/u with dermatology regarding abdominal lesion  []   Hygiene  []  Prevention of cellulitis  []   Wound care   Educated in exercise: [x]   Walking program  []   Lyn ball routine  []   Stick routine  [x]   ROM routine   Instructed in self MLD: Verbal sequence given for opening IA on the left; left inguinal and L upper thigh with upward strokes towards outer left hip. Reviewed with patient as well as demonstration and instruction during MLD portion of the session. Instructed in don/doff of compression system: [x]   Multi layer bandage (MLB) donning principles and wear precautions:  Remove MLB if:  1. Unusual shortness of breath  2. Unrelieved pain despite performance of exercise  3. Uncontrolled itching/discomfort  4. Toes/fingers are blue/cold/numb. Poor capillary refill  5. If bandages are soiled or wet. [x]   Day garments: Will require custom  [x]   Night garments: Will require custom due to stage 3. Considering the [employer] for L and R upper leg - measured for garment on left LE       Therapeutic Exercise/Procedure 0 minutes   Treatment time:  ACOL Exercise Routine Added:  Lyn ball between knees with hip ADD isometric to encourage lymphatic flow at lobules. Hold 5\" x 10-15 reps 2-3 x/day. To date as part of HEP: to perform page 1, and 2.  Removed elevated leg series as they are too difficult. Ankle pumps; CW/ CCW ankle and walking to reduce s/s of tightness of MLB. Stick exercise program: N/A   Free exercises/ROM: N/A   Home program: Patient to perform daily to BID:  [x]   Skin care  [x]   Deep abdominal breathing  [x]   Exercise routine  [x]   Walking program  []   Rest in supine   [x]   Compression bandage  []   Compression garments  []   Vasopneumatic device  []   Wound care  [x]   Self MLD  []   Bring supplies to each therapy visit  []   Purchase necessary supplies  []   Weight loss program  [x]   Follow up with:  Dermatologist regarding R flank \"hematoma/boil\"     Rationale: Exercise will increase the lymph angiomotoricity and tissue pressure of the skin and thus decrease swelling. Modalities 0 minutes   Treatment time:  Vasopneumatic pump:      Manual Lymphatic Drainage (MLD): 9:00 - 10:00 am  Therapeutic activity: 8:45 - 9:00 am 60 minutes    15 minutes     Area to decongest:  [x] LE             [x]  Right     [x]  Left      [] Trunk    Below knee per patient request due to unable to tolerate secondary to pain. Sequence used and effectiveness: [x] Secondary/Primary sequence for lower extremities with trunk involvement:  Diaphragmatic breathing x 10 pre and post; Short neck; B/L Axillary ln; B/L Open IA; B/L inguinal ln; LE primary sequence performed on both LE's. Fibrosis technique performed bilateral distal medial thighs. Care taken L anterior ankle as very sensitive to touch with MLD avoided dorsal MLD here. Noted mild ecchymosis. [] Modifications were made to manual lymph drainage sequence to exclude cervical techniques secondary to patient's age    [] Self MLD sequence/techniques/hand strokes. Skin Care/Observation:   Closed L posterior thigh wound. Noted Dark circular blister like region similar to abdominal regions. Noted improved skin turgor at ankle with some wrinkling of skin.  Increased petechiae and discoloration along anterior ankle both ankles with increased c/o pain/discomfort from bandages rubbing R>L. Used increased cast padding and less comprilan over ankle to improve comfort. Dry skin entire leg with increased redness in skin fold areas along bilateral distance medial thigh, L inguinal and L popliteal.  Applied Sarna anti-itch lotion bilateral lower legs/foot prior to bandaging. B/L Medial knee lobules hard with multiple areas of papillomas. L with increasing pink hue   Upper/Lower extremity compression: Applied multi-layer compression bandaging to: Below knee [x]  Thigh high  []   Upper Extremity []    Right [x]   Left [x]    Bilateral [x]    The following multi-layer bandages were applied:  []  Tricofix            []  Silver Stockinet             [x]  Tg soft  []  Comperm    []  Transelast     Padding layer:  []  Cellona  X 1 each LE  [x]  Fleece x foot/ankle    Foam:  [] 10cm roll   [x] 12cm roll  [] 15cm roll  [] Gray foam:   [x] Velfoam x 2 with cut out ant ankle to improve comfort  [] Komprex 2    Short stretch bandages: R/L  [] 4cm  [x] 6cm   [x] 8cm L LE  [x] 10cm x 2 R LE, x 1 L LE  [] 12 cm  Coban on foot/heel to aid in keeping bandage on the foot. Tubigrip applied over tape proximal aspect of bandaging. Educated patient in multi-layer bandaging donning principles and precautions. Compression bandaging instructions:  1. Compression bandaging will be applied twice a week by therapist in clinic, with adjustments to be made at home as indicated if bandaging becomes loose or uncomfortable.     2. If tolerated, remain bandaged between appointments with therapist, removing under the following conditions--DO NOT WAIT FOR A RETURN PHONE CALL FROM CLINIC:    -Numbness/tingling in extremity different from what you have experienced without the bandages in place.  -Compromise in circulation, monitoring blood refill into toes after applying gentle pressure to toes.  -Onset of pain in extremity that is sudden and severe in nature. -Redness, warmth in limb, and/or fever, flu-like symptoms, which may indicate infection. If this occurs, call your doctor right away. If you note a sudden increase in swelling in extremity, with or without redness/warmth, go to the emergency room as soon as possible to have blood clot ruled out. 3. Compression bandaging supplies that can be laundered are the brown compression wraps and any foam applied for padding. Launder in washer/dryer with NO fabric softener, bleach, woolite. Dry on a low heat. 4. Once compression garments are ordered, compression bandaging will be continued until garments arrive for fitting. This process can take several weeks. Prosthetic Training/Orthotic Fit and Mgt: Patient has been measurement for custom knee high stockings/Medi Assist for L thigh management prior visit. Girth/Volume measurement: Repeat volumes:                                Right                     Left  12/31/19              28,363.46mL       29,402.29mL  10/25/19              28,652.40mL       30,335.70mL  % difference: 3.66%    Right LE has reduced by 288.94mL since eval  Left LE has reduced by 933.41mL since eval       TOTAL TREATMENT 75 mins       ASSESSMENT:   Treatment effectiveness and tolerance: Patient has scheduled f/u appointment with MD regarding new onset B/L UE paraesthesia's and L LE radicular symptoms and h/o cervical degenerative disc. Decreased tolerance to L LE compression due to pain 8/10 and anterior ankle discomfort. Applied MLB b/l however made changes to increase padding and decrease compression over anterior ankle to improve patient comfort and compliance with compression. Shape, and size of upper legs especially medial knee lobules limiting compression options. Medial knee lobules are increasing in density R>L with multiple areas of papillomas and hyperpigmentation noted. Volumes have reduced by 289 mL on right LE and 933 mL on left LE.  Patient has had a [employer] trial to address full leg and abdominal compression and is pending authorization. [employer] Medi Lobule assist custom garment has been measured for on the left. The patient has demonstrated good compliance with all aspects of treatment thus far and is eager to have equipment at home to aid in her independent with caring for her condition. She desperately wants to address medial knee region however was unable to tolerate full leg MLB. The medi Assist option will allow her the flexibility to adjust tension as she tolerates to address this region. She will require one custom device for each LE. Measurements have been completed for initial compression garments in previous visits and we are waiting for insurance authorization. She will need to remain in below knee compression until garments are procured. Due to significant sized medial knee lobules and need for custom equipment which takes longer to obtain, the patient will need an additional 8 visits. She will need to remain in MLB until garments are manufactured, shipped, and fit by Lymphedema therapist.  If equipment is correct she will wear the garments home. However, she will require follow up visits post fitting to ensure effectiveness and proper fit tolerance. If garments were made incorrectly or deficits noted in any way she would need to resume bandaging until the item is sent back to manufacture, re-made, and re-sent to patient. The can be a lengthy process. She is able to self bandage however Is limited by body habitus to get an effective MLB and her spouse has medical conditions preventing him from assisting. CDT is recommended 2x/week for an additional 4 weeks. Patient expressing interest in [facility] weight loss program and PT encouraging. PT also recommending aquatic exercise once wound area fully closed. Progress toward goals: Updated see above     PLAN OF CARE:   Changes to the plan of care: 1.   Patient to see MD to address new UE/LE pain symptoms per above. 2. Re check volumes next 1-2 weeks  3. Continue MLD/MLB  4. Fit garments upon arrival.   Frequency: [x]  2 times a week  []  Weekly  []  Biweekly  []  Monthly   Other: Bodyworks compression: self pay bandages and post op shoe     Chacha Silverman, PT, DPT, CLT

## 2020-01-06 ENCOUNTER — HOSPITAL ENCOUNTER (OUTPATIENT)
Dept: PHYSICAL THERAPY | Age: 65
Discharge: HOME | End: 2020-01-06
Payer: OTHER GOVERNMENT

## 2020-01-06 PROCEDURE — 97140 MANUAL THERAPY 1/> REGIONS: CPT

## 2020-01-06 NOTE — PROGRESS NOTES
_                                    [facility] [address] [facility]  [address] [facility]  [address]  [place] [address]        LYmphedema Therapy  Total # Visit: 12    3/4 additional visits authorized for use through 12/31/2019      [x]                  Daily note               []                 30 day/10th visit progress note    NAME: Hawk Harris  DATE: 1/6/2020    GOALS  Short term goals  Time frame: 4  1. Patient will demonstrate knowledge of signs/symptoms of infections/cellulitis and be independent in skin care to prevent cellulitis. MET 11/20/19  2. Patient will demonstrate independence in lymphedema home program of therapeutic exercises to improve circulation and decongest limb to improve ADLs. 12/11/19 Ongoing. Just purchased [employer]  3. Patient will tolerate multi-layer bandages (MLB) and show measurable decrease in limb volume to allow ordering of home compression system (daytime, nighttime garments and pump as needed). 12/11/19 Partially MET. Only able to tolerate below knee MLB  4.  Volume reduction by 1000mL L LE. Not met, reduced by 289 mL on right and 933 mL on left  5. Spouse will be able to perform below knee MLB with min A in order to improve compliance in compression. 12/11/19 Partially MET--assists only at times due to his medical condition. 6.  Reduced pain of thigh region 3-4/10 in afternoons with use of compression. 12/11/19 Partially MET. Reports 0/10 below knee, however unchanged above knee.     Long term goals  Time frame: 8  1. Pt will show improvement in Lymphedema Life Impact Scale by decreasing impairment percentage to under 65% and thus allow improvement in patient's quality of life. 2.  Patient will be independent with don/doff of compression system and use in order to prevent re accumulation of fluid at discharge.   3.  Pt will be independent in self-MLD and show stable limb volumes showing decongestion and pt. ready for transition to independent restorative phase of lymphedema therapy. 4.  Patient with volume reduction by 2,000 from evaluation       SUBJECTIVE REPORT:  Patient arrives stating she's still bandaging and its getting harder to continue. Eager for compression garments. Patient states she thinks her MD has signed the necessary paperwork. States [employer] stated Flexitouch pump was denied and appeal has been filed. Patient reported seeing ortho MD regarding cervical s/s and UE numbness (see media for xray results). She has been referred to PT and advised to stay on oral antiinflammatory medications. MD will consider local injections if traditional PT does not work. Patient is still very eager to have compression for her knee/thigh region stating its getting bigger and harder with continued c/o  \"my legs are just aching\". Is looking into weight loss program to assist with above. Pain:lower legs 4/10              Gait:    [x] Moderate Independent gait with SPC assistive device for community distances premorbidly:  Using SPC while being bandaged. Slow antalgic with WBOS due to size of upper legs and increased trunk sway. Wearing post op shoes b/l. ADLs:    Treatment Response:    [x]   Patient reviewed packet received at evaluation and asking appropriate questions regarding future POC/compression options/Insurance Auth  [x]   Patient completed home program as prescribed  []   Patient not fully compliant with home program to date  []   Patient waiting on compression garment arrival  Function: MOd I with assistance from spouse as needed.   []   Patient requiring less assistance with completion of home program  []   ADLs are requiring less assistance   []   Patient able to return to work/leisure activities  Lymphedema Life Impact Scale: scores 48 with 71% impairment (score of 53 at eval)  Weight:   TREATMENT AND OBJECTIVE DATA SUMMARY:   Patient/Family Education: Revised order from  emailed to therapist and PT printed out a copy to fax to MD and to hand to patient to give to MD if necessary. Spoke with Liborio Pond at [facility]. She has not received any MD signed order to date. Educated in skin care: [x]   Skin care products:  Creams, f/u with dermatology regarding abdominal lesion  []   Hygiene  []  Prevention of cellulitis  []   Wound care   Educated in exercise: [x]   Walking program  []   Lyn ball routine  []   Stick routine  [x]   ROM routine   Instructed in self MLD: Verbal sequence given for opening IA on the left; left inguinal and L upper thigh with upward strokes towards outer left hip. Reviewed with patient as well as demonstration and instruction during MLD portion of the session. Instructed in don/doff of compression system: [x]   Multi layer bandage (MLB) donning principles and wear precautions:  Remove MLB if:  1. Unusual shortness of breath  2. Unrelieved pain despite performance of exercise  3. Uncontrolled itching/discomfort  4. Toes/fingers are blue/cold/numb. Poor capillary refill  5. If bandages are soiled or wet. [x]   Day garments: Will require custom  [x]   Night garments: Will require custom due to stage 3. Considering the [employer] for L and R upper leg - measured for garment on left LE       Therapeutic Exercise/Procedure 0 minutes   Treatment time:  ACOL Exercise Routine Added:  Standing Brewer exercises as tolerated. To date as part of HEP: to perform page 1, and 2. Removed elevated leg series as they are too difficult. Ankle pumps; CW/ CCW ankle and walking to reduce s/s of tightness of MLB.      Stick exercise program: N/A   Free exercises/ROM: N/A   Home program: Patient to perform daily to BID:  [x]   Skin care  [x]   Deep abdominal breathing  [x]   Exercise routine  [x]   Walking program  []   Rest in supine   [x]   Compression bandage  []   Compression garments  []   Vasopneumatic device  []   Wound care  [x]   Self MLD  []   Bring supplies to each therapy visit  [] Purchase necessary supplies  []   Weight loss program  [x]   Follow up with:  Dermatologist regarding R flank \"hematoma/boil\"     Rationale: Exercise will increase the lymph angiomotoricity and tissue pressure of the skin and thus decrease swelling. Modalities 0 minutes   Treatment time:  Vasopneumatic pump:      Manual Lymphatic Drainage (MLD):  60 minutes     Area to decongest:  [x] LE             [x]  Right     [x]  Left      [] Trunk    Below knee per patient request due to unable to tolerate above knee secondary to knee joint pain. Sequence used and effectiveness: [x] Secondary/Primary sequence for lower extremities with trunk involvement:  Diaphragmatic breathing x 10 pre and post; Short neck; B/L Axillary ln; B/L Open IA; B/L inguinal ln; LE primary sequence performed on both LE's. Fibrosis technique performed bilateral distal medial thighs. Ankle areas improved and less tender to touch. [] Modifications were made to manual lymph drainage sequence to exclude cervical techniques secondary to patient's age    [] Self MLD sequence/techniques/hand strokes. Skin Care/Observation:   Closed L posterior thigh wound. Noted Dark circular blister like region similar to abdominal regions. Noted improved skin turgor at ankle with some wrinkling of skin. Dry skin entire leg with increased redness in skin fold areas along bilateral distance medial thigh, L inguinal and L popliteal.  Applied Sarna anti-itch lotion bilateral lower legs/foot prior to bandaging. B/L Medial knee lobules hard with multiple areas of papillomas. L with increasing pink hue   Upper/Lower extremity compression: Applied multi-layer compression bandaging to:     Below knee [x]  Thigh high  []   Upper Extremity []    Right [x]   Left [x]    Bilateral [x]    The following multi-layer bandages were applied:  []  Tricofix            []  Silver Stockinet             [x]  Tg soft  []  Comperm    []  Transelast     Padding layer:  []  Cellona X 1 each LE  [x]  Fleece x foot/ankle    Foam:  [] 10cm roll   [x] 12cm roll  [] 15cm roll  [] Gray foam:   [x] Velfoam x 2 with cut out ant ankle to improve comfort  [] Komprex 2    Short stretch bandages: R/L  [] 4cm  [x] 6cm   [x] 8cm L LE  [x] 10cm x 2 R LE, x 1 L LE  [] 12 cm  Coban on foot/heel to aid in keeping bandage on the foot. Tubigrip applied over tape proximal aspect of bandaging. Educated patient in multi-layer bandaging donning principles and precautions. Compression bandaging instructions:  1. Compression bandaging will be applied twice a week by therapist in clinic, with adjustments to be made at home as indicated if bandaging becomes loose or uncomfortable. 2. If tolerated, remain bandaged between appointments with therapist, removing under the following conditions--DO NOT WAIT FOR A RETURN PHONE CALL FROM CLINIC:    -Numbness/tingling in extremity different from what you have experienced without the bandages in place.  -Compromise in circulation, monitoring blood refill into toes after applying gentle pressure to toes.  -Onset of pain in extremity that is sudden and severe in nature. -Redness, warmth in limb, and/or fever, flu-like symptoms, which may indicate infection. If this occurs, call your doctor right away. If you note a sudden increase in swelling in extremity, with or without redness/warmth, go to the emergency room as soon as possible to have blood clot ruled out. 3. Compression bandaging supplies that can be laundered are the brown compression wraps and any foam applied for padding. Launder in washer/dryer with NO fabric softener, bleach, woolite. Dry on a low heat. 4. Once compression garments are ordered, compression bandaging will be continued until garments arrive for fitting. This process can take several weeks.      Prosthetic Training/Orthotic Fit and Mgt: Patient has been measurement for custom knee high stockings/Medi Assist for L thigh management prior visit. Girth/Volume measurement: Repeat volumes:                                Right                     Left  12/31/19              28,363.46mL       29,402.29mL  10/25/19              28,652.40mL       30,335.70mL  % difference: 3.66%    Right LE has reduced by 288.94mL since eval  Left LE has reduced by 933.41mL since eval       TOTAL TREATMENT 60 mins       ASSESSMENT:   Treatment effectiveness and tolerance: Patient with reduced volumes and improved LLIS score since onset of care. Patient still waiting for insurance Auth and MD signatures in order to have equipment order processed. She was denied the Flexitouch pump and an appeal has been submitted. Improved ankle skin areas and resumed below knee MLB today. Patient eager for compression equipment in order to start traditional PT for  Cervicalgia. Patient has scheduled her appointments for Feb. 2020    Shape, and size of upper legs especially medial knee lobules limiting compression options. Medial knee lobules are increasing in density R>L with multiple areas of papillomas and hyperpigmentation noted. Volumes have reduced by 289 mL on right LE and 933 mL on left LE. Patient has had a Flexitouch trial to address full leg and abdominal compression and is pending authorization. [employer] Medi Lobule assist custom garment has been measured for on the left. She will require a separate for the R to be measured at a later date. The patient has demonstrated good compliance with all aspects of treatment thus far and is eager to have equipment at home to aid in her independent with caring for her condition. She desperately wants to address medial knee region however was unable to tolerate full leg MLB. The medi Assist option will allow her the flexibility to adjust tension as she tolerates to address this region. She will require one custom device for each LE. She will need to remain in below knee compression until garments are procured.   Due to significant sized medial knee lobules and need for custom equipment which takes longer to obtain, the patient will need an additional 8 visits. She will need to remain in MLB until garments are manufactured, shipped, and fit by Lymphedema therapist.  If equipment is correct she will wear the garments home. However, she will require follow up visits post fitting to ensure effectiveness and proper fit tolerance. If garments were made incorrectly or deficits noted in any way she would need to resume bandaging until the item is sent back to manufacture, re-made, and re-sent to patient. The can be a lengthy process. She is able to self bandage however Is limited by body habitus to get an effective MLB and her spouse has medical conditions preventing him from assisting. CDT is recommended 2x/week for an additional 4 weeks. Patient expressing interest in [place] weight loss program and PT encouraging. PT also recommending aquatic exercise once wound area fully closed. Progress toward goals: Updated see above     PLAN OF CARE:   Changes to the plan of care: 1. Patient to have MD sign revised MD order for compression equipment. 2. Re check volumes next 1-2 weeks  3. Continue MLD/MLB  4.   Fit garments upon arrival.   Frequency: [x]  2 times a week  []  Weekly  []  Biweekly  []  Monthly   Other: Bodyworks compression: self pay bandages and post op shoe     Aubrie Henderson DPT, CLT

## 2020-01-08 ENCOUNTER — HOSPITAL ENCOUNTER (OUTPATIENT)
Dept: PHYSICAL THERAPY | Age: 65
Discharge: HOME | End: 2020-01-08
Payer: OTHER GOVERNMENT

## 2020-01-08 PROCEDURE — 97140 MANUAL THERAPY 1/> REGIONS: CPT

## 2020-01-08 NOTE — PROGRESS NOTES
[facility]  Lymphedema Clinic and Cancer Rehabilitation  [address] [place] [address]  [address]  Tete Arredondo [address]        LYmphedema Therapy  Total # Visit: 13      [x]                  Daily note               []                 30 day/10th visit progress note    NAME: Stephanie Plata  DATE: 1/8/2020    GOALS  Short term goals  Time frame: 4  1. Patient will demonstrate knowledge of signs/symptoms of infections/cellulitis and be independent in skin care to prevent cellulitis. MET 11/20/19  2. Patient will demonstrate independence in lymphedema home program of therapeutic exercises to improve circulation and decongest limb to improve ADLs. 12/11/19 Ongoing. Just purchased [employer]  3. Patient will tolerate multi-layer bandages (MLB) and show measurable decrease in limb volume to allow ordering of home compression system (daytime, nighttime garments and pump as needed). 12/11/19 Partially MET. Only able to tolerate below knee MLB  4.  Volume reduction by 1000mL L LE. Not met, reduced by 289 mL on right and 933 mL on left  5. Spouse will be able to perform below knee MLB with min A in order to improve compliance in compression. 12/11/19 Partially MET--assists only at times due to his medical condition. 6.  Reduced pain of thigh region 3-4/10 in afternoons with use of compression. 12/11/19 Partially MET. Reports 0/10 below knee, however unchanged above knee.     Long term goals  Time frame: 8  1. Pt will show improvement in Lymphedema Life Impact Scale by decreasing impairment percentage to under 65% and thus allow improvement in patient's quality of life. 2.  Patient will be independent with don/doff of compression system and use in order to prevent re accumulation of fluid at discharge.   3.  Pt will be independent in self-MLD and show stable limb volumes showing decongestion and pt. ready for transition to independent restorative phase of lymphedema therapy. 4.  Patient with volume reduction by 2,000 from evaluation       SUBJECTIVE REPORT:  Patient arrives with bandages on. Arrives with signed MD order from vendor to obtain compression equipment. Faxed to  from this clinic today. Patient c/o increased discomfort at ankles without use of velfoam and requesting not to use cast padding at foot/ankle as the bandages slip off. Brought denial letter from [facility] regarding Flexitouch pump. Patient states she is appealing. Pain:lower legs 4/10              Gait:    [x] Moderate Independent gait with SPC assistive device for community distances premorbidly:  Using SPC while being bandaged. Slow antalgic with WBOS due to size of upper legs and increased trunk sway. Wearing post op shoes b/l. ADLs:    Treatment Response:    [x]   Patient reviewed packet received at evaluation and asking appropriate questions regarding future POC/compression options/Insurance Auth  [x]   Patient completed home program as prescribed  []   Patient not fully compliant with home program to date  []   Patient waiting on compression garment arrival  Function: MOd I with assistance from spouse as needed. []   Patient requiring less assistance with completion of home program  []   ADLs are requiring less assistance   []   Patient able to return to work/leisure activities  Lymphedema Life Impact Scale: scores 48 with 71% impairment (score of 53 at eval)  Weight:   TREATMENT AND OBJECTIVE DATA SUMMARY:   Patient/Family Education: Patient brought signed MD order from  to this clinic. We faxed to  for insurance Auth to be initiated. Auth x 1 week and garment procurement 2-3 weeks. Discussed attempt to perform full R LE MLB. Patient was unable to tolerate L but may be willing to try R LE. Patient willing to consider next visit.      Educated in skin care: [x]   Skin care products:  Creams, f/u with dermatology regarding abdominal lesion  []   Hygiene  []  Prevention of cellulitis  []   Wound care   Educated in exercise: [x]   Walking program  []   Lyn ball routine  []   Stick routine  [x]   ROM routine   Instructed in self MLD: Verbal sequence given for opening IA on the left; left inguinal and L upper thigh with upward strokes towards outer left hip. Reviewed with patient as well as demonstration and instruction during MLD portion of the session. Instructed in don/doff of compression system: [x]   Multi layer bandage (MLB) donning principles and wear precautions:  Remove MLB if:  1. Unusual shortness of breath  2. Unrelieved pain despite performance of exercise  3. Uncontrolled itching/discomfort  4. Toes/fingers are blue/cold/numb. Poor capillary refill  5. If bandages are soiled or wet. [x]   Day garments: Will require custom  [x]   Night garments: Will require custom due to stage 3. Considering the [employer] for L and R upper leg - measured for garment on left LE       Therapeutic Exercise/Procedure 0 minutes   Treatment time:  ACOL Exercise Routine Added:  Standing Brewer exercises as tolerated. To date as part of HEP: to perform page 1, and 2. Removed elevated leg series as they are too difficult. Ankle pumps; CW/ CCW ankle and walking to reduce s/s of tightness of MLB. Stick exercise program: N/A   Free exercises/ROM: N/A   Home program: Patient to perform daily to BID:  [x]   Skin care  [x]   Deep abdominal breathing  [x]   Exercise routine  [x]   Walking program  []   Rest in supine   [x]   Compression bandage  []   Compression garments  []   Vasopneumatic device  []   Wound care  [x]   Self MLD  []   Bring supplies to each therapy visit  []   Purchase necessary supplies  []   Weight loss program  [x]   Follow up with:  Dermatologist regarding R flank \"hematoma/boil\"     Rationale: Exercise will increase the lymph angiomotoricity and tissue pressure of the skin and thus decrease swelling.      Modalities 0 minutes   Treatment time:  Vasopneumatic pump:      Manual Lymphatic Drainage (MLD):  60 minutes     Area to decongest:  [x] LE             [x]  Right     [x]  Left      [] Trunk    Below knee per patient request due to unable to tolerate above knee secondary to knee joint pain. Sequence used and effectiveness: [x] Secondary/Primary sequence for lower extremities with trunk involvement:  Diaphragmatic breathing x 10 pre and post; Short neck; B/L Axillary ln; B/L Open IA; B/L inguinal ln; LE primary sequence performed on both LE's. Fibrosis technique performed bilateral distal medial thighs. Ankle areas improved and less tender to touch. [] Modifications were made to manual lymph drainage sequence to exclude cervical techniques secondary to patient's age    [] Self MLD sequence/techniques/hand strokes. Skin Care/Observation:   Closed L posterior thigh wound. Noted Dark circular blister like region medial thigh region with new healing skin. Appears like a blister that had previously opened and healed. Noted improved shaping of L lower leg with last bandage. More conical in shape. Areas with compression skin has greater mobility and less taut appearance    Dry skin entire leg with increased redness in skin fold areas along bilateral distance medial thigh, L inguinal and L popliteal.  Applied Sarna anti-itch lotion bilateral lower legs/foot prior to bandaging. Calmoseptine L anterior ankle    B/L Medial knee lobules hard with multiple areas of papillomas. L with increasing pink hue   Upper/Lower extremity compression: Applied multi-layer compression bandaging to:     Below knee [x]  Thigh high  []   Upper Extremity []    Right [x]   Left [x]    Bilateral [x]    The following multi-layer bandages were applied:  []  Tricofix            []  Silver Stockinet             [x]  Tg soft  []  Comperm    []  Transelast     Padding layer:  []  Cellona  X 1 each LE  [x]  Fleece x foot/ankle    Foam:  [] 10cm roll   [x] 12cm roll  [] 15cm roll  [] Gray foam:   [x] Velfoam x 2 with cut out ant ankle to improve comfort  [] Komprex 2    Short stretch bandages: R/L  [] 4cm  [x] 6cm   [x] 8cm L LE  [x] 10cm x 1 R LE, x 1 L LE  [] 12 cm  Coban on foot/heel to aid in keeping bandage on the foot. Tubigrip applied over tape proximal aspect of bandaging. Educated patient in multi-layer bandaging donning principles and precautions. Compression bandaging instructions:  1. Compression bandaging will be applied twice a week by therapist in clinic, with adjustments to be made at home as indicated if bandaging becomes loose or uncomfortable. 2. If tolerated, remain bandaged between appointments with therapist, removing under the following conditions--DO NOT WAIT FOR A RETURN PHONE CALL FROM CLINIC:    -Numbness/tingling in extremity different from what you have experienced without the bandages in place.  -Compromise in circulation, monitoring blood refill into toes after applying gentle pressure to toes.  -Onset of pain in extremity that is sudden and severe in nature. -Redness, warmth in limb, and/or fever, flu-like symptoms, which may indicate infection. If this occurs, call your doctor right away. If you note a sudden increase in swelling in extremity, with or without redness/warmth, go to the emergency room as soon as possible to have blood clot ruled out. 3. Compression bandaging supplies that can be laundered are the brown compression wraps and any foam applied for padding. Launder in washer/dryer with NO fabric softener, bleach, woolite. Dry on a low heat. 4. Once compression garments are ordered, compression bandaging will be continued until garments arrive for fitting. This process can take several weeks. Prosthetic Training/Orthotic Fit and Mgt: Patient has been measurement for custom knee high stockings/[employer] for L thigh management prior visit.    Girth/Volume measurement: Repeat volumes: Right                     Left  12/31/19              28,363.46mL       29,402.29mL  10/25/19              28,652.40mL       30,335.70mL  % difference: 3.66%    Right LE has reduced by 288.94mL since eval  Left LE has reduced by 933.41mL since eval     Affected Side: B/L   Left (cm) Right (cm)   5th Tuberosity 24    25.5      Ankle 28    27.1      Calf 52    56.7      Mid Knee 84    78      Thigh 103.5    97      Groin             Length               TOTAL TREATMENT 60 mins       ASSESSMENT:   Treatment effectiveness and tolerance: Patient with greater knee and mid thigh girths compared to previous visits. Patients lower legs responding well to compression with improved skin changes and shaping. PT suggesting a trial of above knee MLB on the right leg. Even though L>R she may tolerate full leg compression on R better since knee issues are less on that side. Patient is considering for next visit. Signed  order for authorization was obtained by patient and document faxed to  today. Will need Insurance Auth and garment production to occur and will need to remain bandaged until garments arrive. Due to significant sized medial knee lobules and need for custom equipment which takes longer to obtain, the patient will need to continue with weekly visits. Patient agrees to self bandage as she reports reduced UE numbness. She will need to remain in MLB until garments are manufactured, shipped, and fit by Lymphedema therapist.  If equipment is correct she will wear the garments home. However, she will require follow up visits post fitting to ensure effectiveness and proper fit tolerance. If garments were made incorrectly or deficits noted in any way she would need to resume bandaging until the item is sent back to manufacture, re-made, and re-sent to patient. The can be a lengthy process. Patient expressing interest in [employer] weight loss program and PT encouraging.  PT also recommending aquatic exercise once wound area fully closed. Progress toward goals: Updated see above     PLAN OF CARE:   Changes to the plan of care: 1. Consider R full leg MLB next visit? 2. Re check volumes next 1-2 weeks  3. Continue MLD/MLB  4.   Fit garments upon arrival.   Frequency: [x]  2 times a week  []  Weekly  []  Biweekly  []  Monthly   Other: Bodyworks compression: self pay bandages and post op shoe     Aubrie Bruce DPT, CLT

## 2020-01-13 ENCOUNTER — APPOINTMENT (OUTPATIENT)
Dept: PHYSICAL THERAPY | Age: 65
End: 2020-01-13
Payer: OTHER GOVERNMENT

## 2020-01-15 ENCOUNTER — HOSPITAL ENCOUNTER (OUTPATIENT)
Dept: PHYSICAL THERAPY | Age: 65
Discharge: HOME | End: 2020-01-15
Payer: OTHER GOVERNMENT

## 2020-01-15 PROCEDURE — 97530 THERAPEUTIC ACTIVITIES: CPT

## 2020-01-15 PROCEDURE — 97140 MANUAL THERAPY 1/> REGIONS: CPT

## 2020-01-15 NOTE — PROGRESS NOTES
[facility]  Lymphedema Clinic and Cancer Rehabilitation  [address] [place] [address]  [address]  Tete [address]        LYmphedema Therapy  Total # Visit: 14      [x]                  Daily note               []                 30 day/10th visit progress note    NAME: Stephanie Plata  DATE: 1/15/2020    GOALS  Short term goals  Time frame: 4  1. Patient will demonstrate knowledge of signs/symptoms of infections/cellulitis and be independent in skin care to prevent cellulitis. MET 11/20/19  2. Patient will demonstrate independence in lymphedema home program of therapeutic exercises to improve circulation and decongest limb to improve ADLs. 12/11/19 Ongoing. Just purchased [employer]  3. Patient will tolerate multi-layer bandages (MLB) and show measurable decrease in limb volume to allow ordering of home compression system (daytime, nighttime garments and pump as needed). 12/11/19 Partially MET. Only able to tolerate below knee MLB  4.  Volume reduction by 1000mL L LE. Not met, reduced by 289 mL on right and 933 mL on left  5. Spouse will be able to perform below knee MLB with min A in order to improve compliance in compression. 12/11/19 Partially MET--assists only at times due to his medical condition. 6.  Reduced pain of thigh region 3-4/10 in afternoons with use of compression. 12/11/19 Partially MET. Reports 0/10 below knee, however unchanged above knee.     Long term goals  Time frame: 8  1. Pt will show improvement in Lymphedema Life Impact Scale by decreasing impairment percentage to under 65% and thus allow improvement in patient's quality of life. 2.  Patient will be independent with don/doff of compression system and use in order to prevent re accumulation of fluid at discharge.   3.  Pt will be independent in self-MLD and show stable limb volumes showing decongestion and pt. ready for transition to independent restorative phase of lymphedema therapy. 4.  Patient with volume reduction by 2,000 from evaluation       SUBJECTIVE REPORT:  Patient arrives with bandages removed, stating the took them off over the weekend. Patient c/o decreased discomfort anterior ankles with use of velfoam and requests to continue use today with bandaging. Patient agreeable to proceed with treatment today. Pain:lower legs 4/10              Gait:    [x] Moderate Independent gait with SPC assistive device for community distances premorbidly:  Using SPC while being bandaged. Slow antalgic with WBOS due to size of upper legs and increased trunk sway. Wearing post op shoes b/l. ADLs:    Treatment Response:    [x]   Patient reviewed packet received at evaluation and asking appropriate questions regarding future POC/compression options/Insurance Auth  [x]   Patient completed home program as prescribed  []   Patient not fully compliant with home program to date  []   Patient waiting on compression garment arrival  Function: MOd I with assistance from spouse as needed. []   Patient requiring less assistance with completion of home program  []   ADLs are requiring less assistance   []   Patient able to return to work/leisure activities  Lymphedema Life Impact Scale: scores 48 with 71% impairment (score of 53 at eval)  Weight:   TREATMENT AND OBJECTIVE DATA SUMMARY:   Patient/Family Education: Patient brought signed MD order from  to this clinic. We faxed to  for insurance Auth to be initiated. Auth x 1 week and garment procurement 2-3 weeks. Discussed attempt to perform full R LE MLB. Patient was unable to tolerate L but may be willing to try R LE. Patient willing to consider next visit.      Educated in skin care: [x]   Skin care products:  Creams, f/u with dermatology regarding abdominal lesion  []   Hygiene  []  Prevention of cellulitis  []   Wound care   Educated in exercise: [x]   Walking program  []   Lyn ball routine  []   Stick routine  [x]   ROM routine Instructed in self MLD: Verbal sequence given for opening IA on the left; left inguinal and L upper thigh with upward strokes towards outer left hip. Reviewed with patient as well as demonstration and instruction during MLD portion of the session. Instructed in don/doff of compression system: [x]   Multi layer bandage (MLB) donning principles and wear precautions:  Remove MLB if:  1. Unusual shortness of breath  2. Unrelieved pain despite performance of exercise  3. Uncontrolled itching/discomfort  4. Toes/fingers are blue/cold/numb. Poor capillary refill  5. If bandages are soiled or wet. [x]   Day garments: Will require custom  [x]   Night garments: Will require custom due to stage 3. Considering the [employer] for L and R upper leg - measured for garment on left LE       Therapeutic Exercise/Procedure 0 minutes   Treatment time:  ACOL Exercise Routine Added:  Standing Brewer exercises as tolerated. To date as part of HEP: to perform page 1, and 2. Removed elevated leg series as they are too difficult. Ankle pumps; CW/ CCW ankle and walking to reduce s/s of tightness of MLB. Stick exercise program: N/A   Free exercises/ROM: N/A   Home program: Patient to perform daily to BID:  [x]   Skin care  [x]   Deep abdominal breathing  [x]   Exercise routine  [x]   Walking program  []   Rest in supine   [x]   Compression bandage  []   Compression garments  []   Vasopneumatic device  []   Wound care  [x]   Self MLD  []   Bring supplies to each therapy visit  []   Purchase necessary supplies  []   Weight loss program  [x]   Follow up with:  Dermatologist regarding R flank \"hematoma/boil\"     Rationale: Exercise will increase the lymph angiomotoricity and tissue pressure of the skin and thus decrease swelling.      Modalities 0 minutes   Treatment time:  Vasopneumatic pump:      Manual therapy: 12:50-1:35 pm  Therapeutic activity: 1:35-2:00 pm 45 minutes  25 minutes     Area to decongest:  [x] LE [x]  Right     [x]  Left      [] Trunk    Below knee per patient request due to unable to tolerate above knee secondary to knee joint pain. Sequence used and effectiveness:  Manual therapy [x] Secondary/Primary sequence for lower extremities with trunk involvement:  Diaphragmatic breathing x 10 pre and post; Short neck; B/L Axillary ln; B/L Open IA; B/L inguinal ln; LE primary sequence performed on both LE's. Fibrosis technique performed bilateral distal medial thighs. Ankle areas improved and less tender to touch. [] Modifications were made to manual lymph drainage sequence to exclude cervical techniques secondary to patient's age    [] Self MLD sequence/techniques/hand strokes. Skin Care/Observation:   Closed L posterior thigh wound. Noted Dark circular blister like region medial thigh region with new healing skin. Appears like a blister that had previously opened and healed. Noted improved shaping of L lower leg with last bandage. More conical in shape. Areas with compression skin has greater mobility and less taut appearance    Dry skin entire leg with increased redness in skin fold areas along bilateral distance medial thigh, L inguinal and L popliteal.  Applied Sarna anti-itch lotion bilateral lower legs/foot prior to bandaging. Calmoseptine L anterior ankle    B/L Medial knee lobules hard with multiple areas of papillomas. L with increasing pink hue   Upper/Lower extremity compression:  Therapeutic activity Applied multi-layer compression bandaging to: Below knee [x]  Thigh high  []   Upper Extremity []    Right [x]   Left [x]    Bilateral [x]    The following multi-layer bandages were applied:  []  Tricofix            []  Silver Stockinet             [x]  Tg soft  []  Comperm    []  Transelast     Padding layer:  []  Cellona  X 1 each LE  [x]  Fleece x foot/ankle; added cast padding to address gapping of fleece on foot.     Foam:  [] 10cm roll   [x] 12cm roll  [] 15cm roll  [] Gray foam: [x] Velfoam x 2 with cut out ant ankle to improve comfort  [] Komprex 2    Short stretch bandages: R/L  [] 4cm  [x] 6cm x 2  [] 8cm L LE  [x] 10cm x 1 R LE, x 1 L LE  [] 12 cm  Coban on foot/heel to aid in keeping bandage on the foot. Tubigrip applied over tape proximal aspect of bandaging. Educated patient in multi-layer bandaging donning principles and precautions. Compression bandaging instructions:  1. Compression bandaging will be applied twice a week by therapist in clinic, with adjustments to be made at home as indicated if bandaging becomes loose or uncomfortable. 2. If tolerated, remain bandaged between appointments with therapist, removing under the following conditions--DO NOT WAIT FOR A RETURN PHONE CALL FROM CLINIC:    -Numbness/tingling in extremity different from what you have experienced without the bandages in place.  -Compromise in circulation, monitoring blood refill into toes after applying gentle pressure to toes.  -Onset of pain in extremity that is sudden and severe in nature. -Redness, warmth in limb, and/or fever, flu-like symptoms, which may indicate infection. If this occurs, call your doctor right away. If you note a sudden increase in swelling in extremity, with or without redness/warmth, go to the emergency room as soon as possible to have blood clot ruled out. 3. Compression bandaging supplies that can be laundered are the brown compression wraps and any foam applied for padding. Launder in washer/dryer with NO fabric softener, bleach, woolite. Dry on a low heat. 4. Once compression garments are ordered, compression bandaging will be continued until garments arrive for fitting. This process can take several weeks. Prosthetic Training/Orthotic Fit and Mgt: Patient has been measurement for custom knee high stockings/[employer] for L thigh management prior visit.    Girth/Volume measurement: Repeat volumes:                                Right Left  12/31/19              28,363.46mL       29,402.29mL  10/25/19              28,652.40mL       30,335.70mL  % difference: 3.66%    Right LE has reduced by 288.94mL since eval  Left LE has reduced by 933.41mL since eval       TOTAL TREATMENT 70 mins       ASSESSMENT:   Treatment effectiveness and tolerance: Patient with greater knee and mid thigh girths compared to previous visits. Patients lower legs responding well to compression with improved skin changes and shaping. PT suggesting a trial of above knee MLB on the right leg. Even though L>R she may tolerate full leg compression on R better since knee issues are less on that side. Patient is considering for next visit. Signed  order for authorization was obtained by patient and document faxed to  today. Will need Insurance Auth and garment production to occur and will need to remain bandaged until garments arrive. Due to significant sized medial knee lobules and need for custom equipment which takes longer to obtain, the patient will need to continue with weekly visits. Patient agrees to self bandage as she reports reduced UE numbness. She will need to remain in MLB until garments are manufactured, shipped, and fit by Lymphedema therapist.  If equipment is correct she will wear the garments home. However, she will require follow up visits post fitting to ensure effectiveness and proper fit tolerance. If garments were made incorrectly or deficits noted in any way she would need to resume bandaging until the item is sent back to manufacture, re-made, and re-sent to patient. The can be a lengthy process. Appealing denial of Flexitouch pump, which is medically necessary for home use by patient to allow for effective home management of lymphedema. Patient expressing interest in [employer] weight loss program and PT encouraging. PT also recommending aquatic exercise once wound area fully closed.    Progress toward goals: Updated see above     PLAN OF CARE:   Changes to the plan of care: 1. Consider R full leg MLB next visit? 2. Re check volumes next 1-2 weeks  3. Continue MLD/MLB  4.   Fit garments upon arrival.   Frequency: [x]  2 times a week  [x]  Weekly  []  Biweekly  []  Monthly   Other: Bodyworks compression: self pay bandages and post op shoe     Micah Lindquist, PT, CLT

## 2020-01-20 ENCOUNTER — HOSPITAL ENCOUNTER (OUTPATIENT)
Dept: PHYSICAL THERAPY | Age: 65
Discharge: HOME | End: 2020-01-20
Payer: OTHER GOVERNMENT

## 2020-01-20 PROCEDURE — 97140 MANUAL THERAPY 1/> REGIONS: CPT

## 2020-01-20 NOTE — PROGRESS NOTES
Formerly [facility], [facility]  Lymphedema Clinic and Cancer Rehabilitation  [address] [place] [address]  [address]  Tete Arredondo [address]        LYmphedema Therapy  Total # Visit: 15      [x]                  Daily note               []                 30 day/10th visit progress note    NAME: Nolan Klinefelter  DATE: 1/20/2020    GOALS  Short term goals  Time frame: 4  1. Patient will demonstrate knowledge of signs/symptoms of infections/cellulitis and be independent in skin care to prevent cellulitis. MET 11/20/19  2. Patient will demonstrate independence in lymphedema home program of therapeutic exercises to improve circulation and decongest limb to improve ADLs. 12/11/19 Ongoing. Just purchased [employer]  3. Patient will tolerate multi-layer bandages (MLB) and show measurable decrease in limb volume to allow ordering of home compression system (daytime, nighttime garments and pump as needed). 12/11/19 Partially MET. Only able to tolerate below knee MLB  4.  Volume reduction by 1000mL L LE. Not met, reduced by 289 mL on right and 933 mL on left  5. Spouse will be able to perform below knee MLB with min A in order to improve compliance in compression. 12/11/19 Partially MET--assists only at times due to his medical condition. 6.  Reduced pain of thigh region 3-4/10 in afternoons with use of compression. 12/11/19 Partially MET. Reports 0/10 below knee, however unchanged above knee.     Long term goals  Time frame: 8  1. Pt will show improvement in Lymphedema Life Impact Scale by decreasing impairment percentage to under 65% and thus allow improvement in patient's quality of life. 2.  Patient will be independent with don/doff of compression system and use in order to prevent re accumulation of fluid at discharge.   3.  Pt will be independent in self-MLD and show stable limb volumes showing decongestion and pt. ready for transition to independent restorative phase of lymphedema therapy. 4.  Patient with volume reduction by 2,000 from evaluation       SUBJECTIVE REPORT:  Patient arrives with knee high bandaging in place. States she tolerated bandaging well since last visit. Patient agreeable to proceed with treatment today. Pain:lower legs 4/10              Gait:    [x] Moderate Independent gait with SPC assistive device for community distances premorbidly:  Using SPC while being bandaged. Slow antalgic with WBOS due to size of upper legs and increased trunk sway. Wearing post op shoes b/l. ADLs:    Treatment Response:    [x]   Patient reviewed packet received at evaluation and asking appropriate questions regarding future POC/compression options/Insurance Auth  [x]   Patient completed home program as prescribed  []   Patient not fully compliant with home program to date  []   Patient waiting on compression garment arrival  Function: MOd I with assistance from spouse as needed. []   Patient requiring less assistance with completion of home program  []   ADLs are requiring less assistance   []   Patient able to return to work/leisure activities  Lymphedema Life Impact Scale: scores 48 with 71% impairment (score of 53 at eval)  Weight:   TREATMENT AND OBJECTIVE DATA SUMMARY:   Patient/Family Education: Patient brought signed MD order from  to this clinic. We faxed to  for insurance Auth to be initiated. Auth x 1 week and garment procurement 2-3 weeks. Discussed attempt to perform full R LE MLB. Patient was unable to tolerate L but may be willing to try R LE. Patient willing to consider next visit.      Educated in skin care: [x]   Skin care products:  Creams, f/u with dermatology regarding abdominal lesion  []   Hygiene  []  Prevention of cellulitis  []   Wound care   Educated in exercise: [x]   Walking program  []   Lyn ball routine  []   Stick routine  [x]   ROM routine   Instructed in self MLD: Verbal sequence given for opening IA on the left; left inguinal and L upper thigh with upward strokes towards outer left hip. Reviewed with patient as well as demonstration and instruction during MLD portion of the session. Instructed in don/doff of compression system: [x]   Multi layer bandage (MLB) donning principles and wear precautions:  Remove MLB if:  1. Unusual shortness of breath  2. Unrelieved pain despite performance of exercise  3. Uncontrolled itching/discomfort  4. Toes/fingers are blue/cold/numb. Poor capillary refill  5. If bandages are soiled or wet. [x]   Day garments: Will require custom  [x]   Night garments: Will require custom due to stage 3. Considering the [employer] for L and R upper leg - measured for garment on left LE       Therapeutic Exercise/Procedure 0 minutes   Treatment time:  ACOL Exercise Routine Added:  Standing Brewer exercises as tolerated. To date as part of HEP: to perform page 1, and 2. Removed elevated leg series as they are too difficult. Ankle pumps; CW/ CCW ankle and walking to reduce s/s of tightness of MLB. Stick exercise program: N/A   Free exercises/ROM: N/A   Home program: Patient to perform daily to BID:  [x]   Skin care  [x]   Deep abdominal breathing  [x]   Exercise routine  [x]   Walking program  []   Rest in supine   [x]   Compression bandage  []   Compression garments  []   Vasopneumatic device  []   Wound care  [x]   Self MLD  []   Bring supplies to each therapy visit  []   Purchase necessary supplies  []   Weight loss program  [x]   Follow up with:  Dermatologist regarding R flank \"hematoma/boil\"     Rationale: Exercise will increase the lymph angiomotoricity and tissue pressure of the skin and thus decrease swelling.      Modalities 0 minutes   Treatment time:  Vasopneumatic pump:      Manual therapy: 11:17 am-12:15 pm   58 minutes       Area to decongest:  [x] LE             [x]  Right     [x]  Left      [] Trunk    Below knee per patient request due to unable to tolerate above knee secondary to knee joint pain.   Sequence used and effectiveness:  Manual therapy [x] Secondary/Primary sequence for lower extremities with trunk involvement:  LE primary sequence performed on both LE's. Fibrosis technique performed bilateral distal medial thighs. Ankle areas improved and less tender to touch. [] Modifications were made to manual lymph drainage sequence to exclude cervical techniques secondary to patient's age    [] Self MLD sequence/techniques/hand strokes. Skin Care/Observation:   Skin intact. Dry skin proximal LE's, with lower leg skin appearance and turgor improved with skin care performed in clinic and compression bandaging continued. Applied Eucerin intensive repair bilateral lower legs, with patient to lotion knee/thigh region night. B/L Medial knee lobules hard with multiple areas of papillomas. L with increasing pink hue   Upper/Lower extremity compression:   Applied multi-layer compression bandaging to: Below knee [x]  Thigh high  []   Upper Extremity []    Right [x]   Left [x]    Bilateral [x]    The following multi-layer bandages were applied:  []  Tricofix            []  Silver Stockinet             [x]  Tg soft  []  Comperm    []  Transelast     Padding layer:  []  Cellona  X 1 each LE  [x]  Fleece x foot/ankle; added cast padding to address gapping of fleece on foot. Foam:  [] 10cm roll   [x] 12cm roll  [] 15cm roll  [] Gray foam:   [] Velfoam x 2 with cut out ant ankle to improve comfort  [] Komprex 2    Short stretch bandages: R/L  [] 4cm  [x] 6cm x 2  [] 8cm L LE  [x] 10cm x 2 R LE, x 1 L LE--10 cm  [] 12 cm  Tubigrip applied over tape proximal aspect of bandaging. Educated patient in multi-layer bandaging donning principles and precautions. Compression bandaging instructions:  1. Compression bandaging will be applied twice a week by therapist in clinic, with adjustments to be made at home as indicated if bandaging becomes loose or uncomfortable.     2. If tolerated, remain bandaged between appointments with therapist, removing under the following conditions--DO NOT WAIT FOR A RETURN PHONE CALL FROM CLINIC:    -Numbness/tingling in extremity different from what you have experienced without the bandages in place.  -Compromise in circulation, monitoring blood refill into toes after applying gentle pressure to toes.  -Onset of pain in extremity that is sudden and severe in nature. -Redness, warmth in limb, and/or fever, flu-like symptoms, which may indicate infection. If this occurs, call your doctor right away. If you note a sudden increase in swelling in extremity, with or without redness/warmth, go to the emergency room as soon as possible to have blood clot ruled out. 3. Compression bandaging supplies that can be laundered are the brown compression wraps and any foam applied for padding. Launder in washer/dryer with NO fabric softener, bleach, woolite. Dry on a low heat. 4. Once compression garments are ordered, compression bandaging will be continued until garments arrive for fitting. This process can take several weeks. Prosthetic Training/Orthotic Fit and Mgt: Patient has been measurement for custom knee high stockings/[employer] for L thigh management prior visit.    Girth/Volume measurement: Repeat volumes:                                Right                     Left  12/31/19              28,363.46mL       29,402.29mL  10/25/19              28,652.40mL       30,335.70mL  % difference: 3.66%    Right LE has reduced by 288.94mL since eval  Left LE has reduced by 933.41mL since eval     Affected Side: bilateral lower legs   Left (cm) Right (cm)   5th Tuberosity 23.3    23.5      Ankle 28.4    27.2      Calf 50.3    49.5      Mid Knee             Thigh             Groin             Length               TOTAL TREATMENT 58 mins       ASSESSMENT:   Treatment effectiveness and tolerance:  Patients lower legs responding well to compression with improved skin changes and shaping. PT suggesting a trial of above knee MLB on the right leg. Even though L>R she may tolerate full leg compression on R better since knee issues are less on that side. Patient is considering for next visit. Signed  order for authorization was obtained by patient and document faxed to  today. Will need Insurance Auth and garment production to occur and will need to remain bandaged until garments arrive. Due to significant sized medial knee lobules and need for custom equipment which takes longer to obtain, the patient will need to continue with weekly visits. Patient agrees to self bandage as she reports reduced UE numbness. She will need to remain in MLB until garments are manufactured, shipped, and fit by Lymphedema therapist.  If equipment is correct she will wear the garments home. However, she will require follow up visits post fitting to ensure effectiveness and proper fit tolerance. If garments were made incorrectly or deficits noted in any way she would need to resume bandaging until the item is sent back to manufacture, re-made, and re-sent to patient. The can be a lengthy process. Appealing denial of Flexitouch pump, which is medically necessary for home use by patient to allow for effective home management of lymphedema. Patient expressing interest in [employer] weight loss program and PT encouraging. PT also recommending aquatic exercise once wound area fully closed. Progress toward goals: Updated see above     PLAN OF CARE:   Changes to the plan of care: 1. Consider R full leg MLB next visit? 2. Re check volumes next 1-2 weeks  3. Continue MLD/MLB  4.   Fit garments upon arrival.   Frequency: [x]  2 times a week  [x]  Weekly  []  Biweekly  []  Monthly   Other: Bodyworks compression: self pay bandages and post op shoe     Virgin Jhonny, PT, CLT

## 2020-01-22 ENCOUNTER — APPOINTMENT (OUTPATIENT)
Dept: PHYSICAL THERAPY | Age: 65
End: 2020-01-22
Payer: OTHER GOVERNMENT

## 2020-01-27 ENCOUNTER — APPOINTMENT (OUTPATIENT)
Dept: PHYSICAL THERAPY | Age: 65
End: 2020-01-27
Payer: OTHER GOVERNMENT

## 2020-01-29 ENCOUNTER — APPOINTMENT (OUTPATIENT)
Dept: PHYSICAL THERAPY | Age: 65
End: 2020-01-29
Payer: OTHER GOVERNMENT

## 2020-02-07 ENCOUNTER — HOSPITAL ENCOUNTER (OUTPATIENT)
Dept: PHYSICAL THERAPY | Age: 65
Discharge: HOME | End: 2020-02-07
Payer: OTHER GOVERNMENT

## 2020-02-07 PROCEDURE — 97760 ORTHOTIC MGMT&TRAING 1ST ENC: CPT

## 2020-02-07 PROCEDURE — 97140 MANUAL THERAPY 1/> REGIONS: CPT

## 2020-02-07 NOTE — PROGRESS NOTES
[facility]  Lymphedema Clinic and Cancer Rehabilitation  [address] [place] [address]  [address]  T[address]        LYmphedema Therapy  Total # Visit: 16      [x]                  Daily note               []                 30 day/10th visit progress note    NAME: Rahul Pereira  DATE: 2/7/2020    GOALS  Short term goals  Time frame: 4  1. Patient will demonstrate knowledge of signs/symptoms of infections/cellulitis and be independent in skin care to prevent cellulitis. MET 11/20/19  2. Patient will demonstrate independence in lymphedema home program of therapeutic exercises to improve circulation and decongest limb to improve ADLs. 12/11/19 Ongoing. Just purchased [employer]  3. Patient will tolerate multi-layer bandages (MLB) and show measurable decrease in limb volume to allow ordering of home compression system (daytime, nighttime garments and pump as needed). 12/11/19 Partially MET. Only able to tolerate below knee MLB  4.  Volume reduction by 1000mL L LE. Not met, reduced by 289 mL on right and 933 mL on left  5. Spouse will be able to perform below knee MLB with min A in order to improve compliance in compression. 12/11/19 Partially MET--assists only at times due to his medical condition. 6.  Reduced pain of thigh region 3-4/10 in afternoons with use of compression. 12/11/19 Partially MET. Reports 0/10 below knee, however unchanged above knee.     Long term goals  Time frame: 8  1. Pt will show improvement in Lymphedema Life Impact Scale by decreasing impairment percentage to under 65% and thus allow improvement in patient's quality of life. 2.  Patient will be independent with don/doff of compression system and use in order to prevent re accumulation of fluid at discharge.   3.  Pt will be independent in self-MLD and show stable limb volumes showing decongestion and pt. ready for transition to independent restorative phase of lymphedema therapy. 4.  Patient with volume reduction by 2,000 from evaluation       SUBJECTIVE REPORT:  Arrives with custom knee highs in hand. States she is still waiting for appeal decision on pump. Patient states she has not received her other equipment at this time. Patient has been self bandaging the legs and admits \"I can't get it up as high\". Willing to proceed with below knee MLB and will consider L thigh at night. Pain:lower legs 4/10              Gait:    [x] Moderate Independent gait with SPC assistive device for community distances premorbidly:  Using SPC while being bandaged. Slow antalgic with WBOS due to size of upper legs and increased trunk sway. Wearing post op shoes b/l. ADLs:    Treatment Response:    [x]   Patient reviewed packet received at evaluation and asking appropriate questions regarding future POC/compression options/Insurance Auth  [x]   Patient completed home program as prescribed  []   Patient not fully compliant with home program to date  []   Patient waiting on compression garment arrival  Function: MOd I with assistance from spouse as needed. []   Patient requiring less assistance with completion of home program  []   ADLs are requiring less assistance   []   Patient able to return to work/leisure activities  Lymphedema Life Impact Scale: scores 48 with 71% impairment (score of 53 at eval)  Weight:   TREATMENT AND OBJECTIVE DATA SUMMARY:   Patient/Family Education: Return process due to ill fitting custom garments. Spoke with [facility] and RA sent for garments to be re-made by [employer]. Patient educated on process. How to self bandage L knee/thigh was instructed today. PT suggesting to utilize at night only due to poor tolerance initially with increased knee pain during the day.      Educated in skin care: [x]   Skin care products:  Creams, f/u with dermatology regarding abdominal lesion  []   Hygiene  []  Prevention of cellulitis  []   Wound care   Educated in exercise: [x]   Walking program  []   Lyn ball routine  []   Stick routine  [x]   ROM routine   Instructed in self MLD: Verbal sequence given for opening IA on the left; left inguinal and L upper thigh with upward strokes towards outer left hip. Reviewed with patient as well as demonstration and instruction during MLD portion of the session. Instructed in don/doff of compression system: [x]   Multi layer bandage (MLB) donning principles and wear precautions:  Remove MLB if:  1. Unusual shortness of breath  2. Unrelieved pain despite performance of exercise  3. Uncontrolled itching/discomfort  4. Toes/fingers are blue/cold/numb. Poor capillary refill  5. If bandages are soiled or wet. [x]   Day garments: Will require custom  [x]   Night garments: Will require custom due to stage 3. Considering the [employer] for L and R upper leg - measured for garment on left LE       Therapeutic Exercise/Procedure 0 minutes   Treatment time:  ACOL Exercise Routine Added:  Standing Brewer exercises as tolerated. To date as part of HEP: to perform page 1, and 2. Removed elevated leg series as they are too difficult. Ankle pumps; CW/ CCW ankle and walking to reduce s/s of tightness of MLB. Stick exercise program: N/A   Free exercises/ROM: N/A   Home program: Patient to perform daily to BID:  [x]   Skin care  [x]   Deep abdominal breathing  [x]   Exercise routine  [x]   Walking program  []   Rest in supine   [x]   Compression bandage  []   Compression garments  []   Vasopneumatic device  []   Wound care  [x]   Self MLD  []   Bring supplies to each therapy visit  []   Purchase necessary supplies  []   Weight loss program  [x]   Follow up with:  Dermatologist regarding R flank \"hematoma/boil\"     Rationale: Exercise will increase the lymph angiomotoricity and tissue pressure of the skin and thus decrease swelling.      Modalities 0 minutes   Treatment time:  Vasopneumatic pump:      Manual therapy: 1055-1200pm   65 minutes Area to decongest:  [x] LE             [x]  Right     [x]  Left      [] Trunk    Below knee per patient request due to unable to tolerate above knee secondary to knee joint pain. Sequence used and effectiveness:  Manual therapy [x] Secondary/Primary sequence for lower extremities with trunk involvement:  LE primary sequence performed on both LE's. Fibrosis technique performed bilateral distal medial thighs. Ankle areas improved and less tender to touch. [] Modifications were made to manual lymph drainage sequence to exclude cervical techniques secondary to patient's age    [] Self MLD sequence/techniques/hand strokes. Skin Care/Observation:   Skin intact. Dry skin proximal LE's, with lower leg skin appearance and turgor improved with skin care performed in clinic and compression bandaging continued. Applied Eucerin intensive repair bilateral lower legs, with patient to lotion knee/thigh region night. B/L Medial knee lobules hard with multiple areas of papillomas. L with increasing pink hue   Upper/Lower extremity compression:   Applied multi-layer compression bandaging to: Below knee [x]  Thigh high  [x] Instructed with demonstration for patient on how to perform L knee and thigh MLB to be applied nightly. Upper Extremity []    Right [x]   Left [x]    Bilateral [x]    The following multi-layer bandages were applied:  []  Tricofix            []  Silver Stockinet             [x]  Tg soft  []  Comperm    []  Transelast     Padding layer:  []  Cellona  X 1 each LE  [x]  Fleece x foot/ankle; added cast padding to address gapping of fleece on foot. Foam:  [] 10cm roll   [x] 12cm roll  [] 15cm roll  [] Gray foam:   [] Velfoam x 2 with cut out ant ankle to improve comfort  [] Komprex 2    Short stretch bandages: R/L  [] 4cm  [x] 6cm x 2  [] 8cm L LE  [x] 10cm x 2 R LE, x 1 L LE--10 cm  [] 12 cm  Tubigrip applied over tape proximal aspect of bandaging.       Educated patient in [address] [place] [address] bandaging donning principles and precautions. Compression bandaging instructions:  1. Compression bandaging will be applied twice a week by therapist in clinic, with adjustments to be made at home as indicated if bandaging becomes loose or uncomfortable. 2. If tolerated, remain bandaged between appointments with therapist, removing under the following conditions--DO NOT WAIT FOR A RETURN PHONE CALL FROM CLINIC:    -Numbness/tingling in extremity different from what you have experienced without the bandages in place.  -Compromise in circulation, monitoring blood refill into toes after applying gentle pressure to toes.  -Onset of pain in extremity that is sudden and severe in nature. -Redness, warmth in limb, and/or fever, flu-like symptoms, which may indicate infection. If this occurs, call your doctor right away. If you note a sudden increase in swelling in extremity, with or without redness/warmth, go to the emergency room as soon as possible to have blood clot ruled out. 3. Compression bandaging supplies that can be laundered are the brown compression wraps and any foam applied for padding. Launder in washer/dryer with NO fabric softener, bleach, woolite. Dry on a low heat. 4. Once compression garments are ordered, compression bandaging will be continued until garments arrive for fitting. This process can take several weeks. Prosthetic Training/Orthotic Fit and Mgt:  12-12:30 30 minutes:  Hannah custom knee highs applied today in the clinic by therapist JOSEPH VALLE with gloves and then patient able to apply L LE with min A. Patient needs VC for technique on donning to get garment over heel. Patient instructed on how to spread out fabric and avoid pulling on band. Noted knee highs at least 1 inch too short bilaterally. Patient remained in garment x 10 minutes to see how tolerating. Patient likes how the foot and ankle feel but in agreement they need to be longer.     Girth/Volume measurement: Repeat volumes:                                Right                     Left  2/7/20 12/31/19              28,363.46mL       29,402.29mL  10/25/19              28,652.40mL       30,335.70mL  % difference: 3.66%    Right LE has reduced by 288.94mL since eval  Left LE has reduced by 933.41mL since eval     Affected Side: bilateral lower legs   Left (cm) Right (cm)   5th Tuberosity             Ankle             Calf             Mid Knee             Thigh             Groin             Length             Observation:        TOTAL TREATMENT 95 mins       ASSESSMENT:   Treatment effectiveness and tolerance: Contacted  for return authorization due to custom [employer] knee highs too short. Patient's LE's were re-measured and new form sent to  for garment procurement. Garments shipped back to [employer] [employer] from this office. Still waiting for decision on 2nd appeal for pump. Considering  pump however the pump sleeves are too small at the groin to fit over patient's thighs. Contacted [employer] to see if they have a larger size sleeve. Awaiting call back. Patient to trial L Full leg compression at night to work on medial knee lobule and upper thigh shaping. Patients lower legs responding well to compression with improved skin changes and shaping. Due to significant sized medial knee lobules and need for custom equipment which takes longer to obtain, the patient will need to continue with weekly visits. Patient agrees to continue to self bandage but looking forward to getting her equipment    She will need to remain in MLB until garments are remade, shipped, and fit by Lymphedema therapist.  If equipment is correct she will wear the garments home. However, she will require follow up visits post fitting to ensure effectiveness and proper fit tolerance.  If garments were made incorrectly or deficits noted in any way she would need to resume bandaging until the item is sent back to manufacture, re-made, and re-sent to patient. The can be a lengthy process. Appealing 1st denial of Flexitouch pump, which is medically necessary for home use by patient to allow for effective home management of lymphedema. Patient expressing interest in [place] weight loss program and PT encouraging. PT also recommending aquatic exercise once wound area fully closed. Progress toward goals: Updated see above     PLAN OF CARE:   Changes to the plan of care: 1. Fit garments upon arrival.  2.  Entre pump trial with [employer]  3. Consider R LE full leg compression bandages.    Frequency: [x]  2 times a week  [x]  Weekly  []  Biweekly  []  Monthly   Other: Bodyworks compression: self pay bandages and post op shoe     Aubrie Perez DPT, CLT

## 2020-02-19 ENCOUNTER — APPOINTMENT (OUTPATIENT)
Dept: PHYSICAL THERAPY | Age: 65
End: 2020-02-19
Payer: OTHER GOVERNMENT

## 2020-02-26 ENCOUNTER — HOSPITAL ENCOUNTER (OUTPATIENT)
Dept: PHYSICAL THERAPY | Age: 65
Discharge: HOME | End: 2020-02-26
Payer: OTHER GOVERNMENT

## 2020-02-26 PROCEDURE — 97760 ORTHOTIC MGMT&TRAING 1ST ENC: CPT

## 2020-02-26 PROCEDURE — 97140 MANUAL THERAPY 1/> REGIONS: CPT

## 2020-02-26 NOTE — PROGRESS NOTES
[facility]  Lymphedema Clinic and Cancer Rehabilitation  [address] [place] [address]        OUTPATIENT physical Therapy Re-Evaluation       NAME: Malone Koyanagi AGE: 60 y.o. GENDER: female  DATE: 2/26/2020  REFERRING PHYSICIAN: Dr. Charles Francis:  Patient has received CDT from 10/2019 until current. She has been decongested with bandages and wounds are closed. She has been fit into her first pair of custom knee highs which needed to be remade due to short lengths. The remade pair were fit today and patient able to wear in/out of clinic. She is currently using B/L below knee custom Juzo class 2 knee highs, and on the L LE a custom Medi LE Assist for above knee to wear in the evenings and overnight. Insurance has denied the Flexitouch Vasopneumatic Pump through [employer].  Now we are having [address] [place] [address] through the process of  insurance authorization for a BioCompression pantsuit style Vasopneumatic pump for the patient to utilize at home. She was measured for a 2nd pair of below knee custom knee highs and a R LE custom Medi Assist today. Original h/o lymphedema:  Patient presents with 2+ year h/o gradual slow onset of initially L LE swelling and then progression to b/l LE feet and ankles. Swelling then travelled up to the calf region and contained with c/o increased swelling of above knee region. Patient reported initially when symptoms began 2 years ago she was referred to [facility] for compression stockings. Patient reports was fitted for knee highs (per descriptions sounds like ready made Jobst) that eventually became painful and did not fit well so she stopped wearing them after 6 mo. Patient reports some wounds on the back of her thighs and pain in the legs limiting her ADL's and hobbies.      Primary Diagnosis:  · B/L  LE lymphedema, secondary (I89.0)    Other Treatment Diagnoses:  · Abnormality of gait (R26.9)  · Swelling not relieved by elevation (R60.9)  · Morbid obesity (E66.01)  · Diabetic condition (E11.69)    Date of Onset: 6/2017    Present Symptoms and Functional Limitations: Decreased ability to sleep in her bed and sleeps in a recliner,  difficulty finding shoes, clothing to fit, decreased ability to stand and cook over 20-30 min in duration. Needs to sit <-> stand for meal prep/cooking. Only able to walk household distances and with h/o 1 fall about a year ago. Patient c/o increased sized and firm medial knee areas that are worse without compression    Lymphedema Life Impact Scale: Today:  Score of 55 and impairment percentage of 81%. See scanned document  Original eval: Score of 53 and impairment percentage of 78%. See scanned document. Past Medical History:   Past Medical History:   Diagnosis Date    Arthritis     Autoimmune disease ([facility]     FIBROMYALGIA     Cancer ([facility] 2017    Stage IA grade 1 endometrial cancer    Chronic pain     Fibromyalgia    Diabetes ([facility]     diet controlled    Fibromyalgia     GERD (gastroesophageal reflux disease)     Hypercholesteremia     Hypertension     Ill-defined condition     LYMPHEDEMA    Ill-defined condition     NEUROPATHY BOTH FEET    Ill-defined condition     DEGENERATIVE DISC DISEASE- SPURS    Ill-defined condition     Hyperlipidemia    Osteoarthritis of both knees     Plantar fasciitis of left foot      Past Surgical History:   Procedure Laterality Date    HX DILATION AND CURETTAGE  2015    HX GI      COLONOSCOPY    HX HYSTERECTOMY  06/2017    s/p robotic assisted hysterectomy due to endometrial cancer.  no adjuvent therapy    HX ORTHOPAEDIC      right knee arthroscopy    HX ORTHOPAEDIC      L FOOT SURGERY    HX TUBAL LIGATION  1997     Current Medications:  See media for updated scanned list  Current Outpatient Medications   Medication Sig    DULoxetine (CYMBALTA) 30 mg capsule take 1 capsule by mouth once daily    losartan (COZAAR) 100 mg tablet Take 100 mg by mouth daily.  AMLODIPINE BESYLATE, BULK, 10 mg by Does Not Apply route every morning.  diclofenac EC (VOLTAREN) 75 mg EC tablet Take 75 mg by mouth two (2) times a day.  cholecalciferol, vitamin D3, (VITAMIN D3) 2,000 unit tab Take 1 Tab by mouth daily.  GLUCOSAMINE HCL/CHONDR WOLF A NA (OSTEO BI-FLEX PO) Take 2 Tabs by mouth daily.  DOCOSAHEXANOIC ACID/EPA (FISH OIL PO) Take 1,200 mg by mouth two (2) times a day.  cyanocobalamin (VITAMIN B-12) 1,000 mcg tablet Take 1,000 mcg by mouth daily.  cyclobenzaprine (FLEXERIL) 10 mg tablet take 1 tablet by mouth every evening    traMADol (ULTRAM) 50 mg tablet Take 100 mg by mouth two (2) times a day.  pravastatin (PRAVACHOL) 40 mg tablet Take 40 mg by mouth nightly.  aspirin 81 mg chewable tablet Take 81 mg by mouth daily.  MULTIVITS W-FE,OTHER MIN/LUT (CENTRUM SILVER ULTRA WOMEN'S PO) Take  by mouth.  cinnamon bark (CINNAMON) 500 mg cap Take 1,000 mg by mouth daily.  esomeprazole (NEXIUM) 40 mg capsule Take 40 mg by mouth nightly. No current facility-administered medications for this encounter. Allergies: Allergies   Allergen Reactions    Bee Sting [Sting, Bee] Swelling      Social/Work History and Prior Level of Function:  Retired. 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 Living Situation: Private residence [address] with spouse   Trainable Caregiver?: Self, Spouse   Self-care/ADLs: Mod I   Mobility: Mod I household, limited distances. Sleeping Arrangement:  Recliner 100%   Adaptive Equipment Owned: RW, shower chair   Previous Therapy:  CDT 10/2019 to current  Compression/Lymphedema Equipment:  Today wearing B/L below knee custom Juzo Helastic OT, Knee highs top band 3cm, silver ankle patch  Past treatment:Ready Made Compression knee highs by Jobst fitted and worn x 6 mo.  Unable to tolerate due to pain/uncomfortable. SUBJECTIVE:  Patient c/o swelling on the thigh up to the groin and lower abdomen which is above the top part of the Medi assist when she is wearing it . States her leg is softer and smaller when she removes the medi assist but once its off the swelling and firm texture comes back. Patient pleased with remade Juzo stockings although states they seem a bit looser at the top and slip down some still. Patients goals for therapy: \"Work on getting a second pair of stockings and find out what to do about the knee and thighs? EVALUATION AND OBJECTIVE DATA SUMMARY:   Pain: Now 3/10; worst 0/10 at night with use of Medi Assist          Self-Care and ADLs:    Grooming: Independent  Bathing: Independent    UB Dressing: Independent  LB Dressing: Independent    Don/Doff Shoes/Socks: Modified independent  Toileting: Independent      Skin and Tissue Assessment:  Dermal Status:  []   Intact [x]  Dry:  Lower legs with removal of custom stockings.  Skin slough   []  Tenuous [] Flaky   [x]  Wound/lesion present:   []  Scars   []  Dermatitis:     Texture/Consistency:  []  Boggy []  Pitting Edema   [x]  Brawny []  Combination   [x]  Fibrotic/Woody:  Medial/posterior above knee  Thigh region L>R    Pigmentation/Color Change:  []  Normal []  Hemosiderin   []  Red []  Erythematous   [x]  Hyperpigmented:  L and R medial knee lobules, mild ankle []  Hyperlipodermatosclerosis   Anomalies:  []  Lymphorrhea []  Vesicles   []  Petechiae []  Warty Vercusis   []  Bullae:   [x]  Papilloma: medial posterior above knee thigh region   Circulatory:  []  LAI []  Varicosities:   []  Pulses []  Vascular studies ruled out DVT in past   []  DVT History    Nails:  []   Normal  [x]   Fungus: mycotic and yellow all nails b/l feet  Observation:  Today 2/26/20    Right Post  R anterior  R medial  R lateral        L Sigvaris LE Medi assist for knee lobule/thigh:        Evaluation 10/2019  B/L anterior view Posterior View             Posterior leg just below buttock, close up        Left Lateral view            Right Lateral View            B/L Dorsal feet/ankle      Circumference (cm)  HIPS:  171 cm  Abdominal:  133 cm  R Groin:  106 cm  L Groin: 109 cm    Stemmers Sign: pos  Height:   5'6\"  Weight:   372.4lbs  BMI:   60.1  (36 or greater: adversely affecting lymphedema)  Wound/Ulcer:    No open areas    Wound Pain:       Volumetric Measurements:  2/7/20  Right:  28, 360.13mL Left:  31,291.48mL   % Difference: 5.87% L LE larger than R   (See scanned graph)    Volumetric Measurements: 10/25/19  Right:  28,652.40mL Left:  30,335.70mL   % Difference: 5.87% L LE larger than R   (See scanned graph)    Range of Motion: decreased knee hip due to body habitus  Strength: decreased 3+/5  Sensation:   Intact  Mobility:   Independent  Safety:  Patient is alert and oriented:  Yes   Safety awareness:  Yes   Fall Risk?:  no   Patient given written fall prevention handout: Yes   Precautions:  Standard lymphedema precautions to include avoiding blood pressure readings, injections and IVs or other procedures/acts that could lead to broken skin on affected area, and avoiding excessive heat, resistive activity or altitude without compression garment    Re-Evaluation Time: 9:30-10 (30 Min)  TREATMENT PROVIDED:   Treatment time:  9843-3891 Minutes: 30  1. Treatment description:  Ortho fit/prosthetic Training: B/L Custom Juzo Knee highs were remade due to original being too short. Patient wearing in b/l compression knee highs. Noted L top band wrinkled and R knee high slipped down 1-2\". Noted upper girths of garment a bit loose. Patient was educated on how to clean top band with alcohol prep and to use Juzo adherent lotion to keep garment up on the leg. Patient indicates understanding and verbalizes she needs to purchase some. Fit of L LE MEDI Assist with lobule compression.   Noted device was not placed up high enough on the leg and patient states she and her  actually can get it higher. Using knee highs from 8-9am until 10pm then dons L medi assist and wears in the evening and overnight. Treatment time:    Minutes: 20  2. Treatment description:  Therapeutic Activity: Patient measured for 2nd pair of Custom Juzo knee highs. Since patient very pleased with foot and ankle measurements here verified and kept the same. Patient still struggling with upper part of stocking and PT suggesting 5cm silicone border and adding 1-2 cm additional length. Patient in agreement. Used some firm tension on tape measure with uppermost circumference to reduce the loose type feel of the current pair. Patient in agreement. Patient also measured for R LE Sigvaris Medi Assist.  Intensive measurement process with pictures, see above. Garment order faxed/emailed to [employer] for procurement. Patient also measured for B/L Pantsuit in order to trial a Bio Compression pump () as the Flexitouch pump () was denied on appeal.  Patient in agreement that she needs knee and thigh compression for home management. PT recommending Monica style compression however concerns she may have trouble don/doffing over hips. PT suggesting to try her OTC compression Merana pants and if they are difficult to don/doff during the day then we should not get custom Monica but look towards a thigh high Monica. ASSESSMENT:   Matthieu Snyder is a 60 y.o. motivated morbidly obese female who presents today with secondary stage 3 lymphedema of b/l LE with L LE larger than R with a gradual onset x 2+ years. Patient was s/p robotic assisted hysterectomy 2017 due to endometrial stage 1A cancer. Patient without any adjuvant therapy. Patient does not know if lymph nodes were removed/sampled. Patient continues to have trophic changes of the skin on the medial knee lobules and thigh regions due to chronic swelling.   She has abnormal shaping of the entire LE and is requiring custom compression equipment to manage her equipment which we are actively in the process of obtaining. The patients percent volume difference is 5.87%, however both LE's are involved. Knee pain has limited our ability to bandage above thigh however she now has the Edwin E 330 to provide compression to knee lobule and thigh and she has been tolerating very well. The patient will certainly need further custom compression equipment to address her knee lobules, thighs and a custom Monica or custom thigh Therman Askew is warranted. Patient will also benefit from a full pantsuit style pump to manage the entire extremity, hips, and lower abdominal area. She reports fluid being pushed proximally when utilizing thigh compression. Patient will benefit from complete decongestive therapy (CDT) including manual lymphatic drainage (MLD) technique, short-stretch textile bandages/compression system to decongest limb, and kinesiotaping as appropriate. Patient will receive instruction in proper skin care to recognize signs/symptoms of and prevent infection, therapeutic exercise, and self-MLD for independent home program and restorative lymphatic performance. This care is medically necessary due to the infection risk with lymphedema and to improve functional activities. CDT is necessary to resolve swelling to allow patient to return to wearing normal clothes/footwear and prevent worsening of symptoms such as venous stasis ulcerations, infections, or hospitalizations. Patient will be independent with home program strategies to allow improved ADL ability and mobility and to allow patient to return to greatest functional independence. Rehabilitation potential is considered to be Good. Factors which may influence rehabilitation potential include medical history, financial, transportation, spouse with medical issues limiting carryover at home, and BMI.   Patient will benefit from 2x/week  physical therapy visits over 10-12 weeks to optimize improvement in these areas. PLAN OF CARE:   Recommendations and Planned Interventions:  Manual lymph drainage/complete decongestive therapy  Multi-layer compression bandaging (short-stretch)  Compression garment fitting/provision  Lymphedema therapeutic exercise  AROM/PROM/Strength/Coordination  Self-care training  Functional mobility training  Education in skin care and lymphedema precautions  Self-MLD education per home program  Self-bandaging education per home program  Caregiver education as needed  Wound care as needed     GOALS  Short term goals  Time frame: 4  1. Patient will demonstrate knowledge of signs/symptoms of infections/cellulitis and be independent in skin care to prevent cellulitis. 2.  Patient will demonstrate independence in lymphedema home program of therapeutic exercises to improve circulation and decongest limb to improve ADLs. 3.  Patient will tolerate full day daytime compression and full night time compression alternating LE's  4.  Reduce volumes by 500mL  6. Reduced pain of thigh region 3-4/10 in afternoons with use of compression. Long term goals  Time frame: 8  1. Pt will show improvement in Lymphedema Life Impact Scale by decreasing impairment percentage to under 65% and thus allow improvement in patient's quality of life. 2.  Patient will be independent with don/doff of compression system and use in order to prevent re accumulation of fluid at discharge. 3.  Pt will be independent in self-MLD and show stable limb volumes showing decongestion and pt. ready for transition to independent restorative phase of lymphedema therapy. 4.  Patient with volume reduction by 1,000 from evaluation     Patient has participated in goal setting and agrees to work toward plan of care. Patient was instructed to call if questions or concerns arise.     Thank you for this referral.  Briana Pinto DPT       TREATMENT PLAN EFFECTIVE DATES:   2/26/2020 TO 5/23/20  I have read the above plan of care for Mildred Shields. I certify the above prescribed services are required by this patient and are medically necessary.   The above plan of care has been developed in conjunction with the lymphedema/physical therapist.       Physician Signature: ____________________________________Date:______________

## 2020-03-13 ENCOUNTER — APPOINTMENT (OUTPATIENT)
Dept: PHYSICAL THERAPY | Age: 65
End: 2020-03-13

## 2021-09-10 ENCOUNTER — TRANSCRIBE ORDER (OUTPATIENT)
Dept: SCHEDULING | Age: 66
End: 2021-09-10

## 2021-09-10 DIAGNOSIS — M48.062 SPINAL STENOSIS, LUMBAR REGION, WITH NEUROGENIC CLAUDICATION: ICD-10-CM

## 2021-09-10 DIAGNOSIS — M54.50 LOW BACK PAIN: Primary | ICD-10-CM

## 2022-11-13 NOTE — TELEPHONE ENCOUNTER
Called and spoke to pt, advised pt with still havign that much pain she needs to be seen, recommend she come to our office, she states she lives too far out and doesn't have anyone to bring her to our office so she is going to see her PCP. Advised PCP can assess her pain and insomnia issues.   Pt would like PCP to have a copy of op note and pathology report, advised to sign the release from at PCP's office and fax to my attention and we will get the records over as soon as we receive the release, pt verbalized understanding 24

## 2023-03-26 NOTE — TELEPHONE ENCOUNTER
Lymphedema PT contacted [facility] for Auth status on custom garments. UM stating faxed measurements to PCP 12/9/19 and waiting for signatures from MD (PT asked for  to re-send the documents for signature to PCP). I communicated this to patient and encouraged her to contact MD to expedite Auth process due to limited visits. Patient in agreement.   Merritt Bell DPT, CLT Trauma attending procedure note  Procedure: Left radial artery arterial line placement.  Surgeon Dr. Bryn Feldman  Indication: Patient currently hypotensive and tachycardic.  Patient been treated for sepsis.  Description of the procedure: Her left wrist was prepped as usual.  An arterial line catheter was advanced through skin into the artery. Using the Seldinger technique the catheter was secured in place.  Patient tolerated t he procedure very well.  Patient required Valeriy-Synephrine at this time since patient is extremity tachycardic.  Dr. Bryn Feldman

## (undated) DEVICE — SOLUTION IV 1000ML 0.9% SOD CHL

## (undated) DEVICE — SURGICAL PROCEDURE PACK GYN LAPAROSCOPY CUST SMH LF

## (undated) DEVICE — TRAY PREP DRY W/ PREM GLV 2 APPL 6 SPNG 2 UNDPD 1 OVERWRAP

## (undated) DEVICE — VCARE MEDIUM, UTERINE MANIPULATOR, VAGINAL-CERVICAL-AHLUWALIA'S-RETRACTOR-ELEVATOR: Brand: VCARE

## (undated) DEVICE — Device

## (undated) DEVICE — NEEDLE INSUF L120MM DIA2MM DISP FOR PNEUMOPERI ENDOPATH

## (undated) DEVICE — BLADELESS OBTURATOR

## (undated) DEVICE — POUCH SPEC RETRV SHFT 15MM 13X23CM GRN POLYUR DBL RNG HNDL

## (undated) DEVICE — KENDALL SCD EXPRESS SLEEVES, KNEE LENGTH, MEDIUM: Brand: KENDALL SCD

## (undated) DEVICE — SUTURE STRATAFIX SPRL MCRYL + SZ 2-0 L18IN ABSRB UD CT-1 SXMP1B413

## (undated) DEVICE — 3M™ TEGADERM™ TRANSPARENT FILM DRESSING FRAME STYLE, 1626W, 4 IN X 4-3/4 IN (10 CM X 12 CM), 50/CT 4CT/CASE: Brand: 3M™ TEGADERM™

## (undated) DEVICE — INFECTION CONTROL KIT SYS

## (undated) DEVICE — AIRSEAL 8 MM ACCESS PORT AND LOW PROFILE OBTURATOR WITH BLADELESS OPTICAL TIP, 120 MM LENGTH: Brand: AIRSEAL

## (undated) DEVICE — 3M™ DURAPORE™ SURGICAL TAPE 1538-3, 3 INCH X 10 YARD (7,5CM X 9,1M), 4 ROLLS/BOX: Brand: 3M™ DURAPORE™

## (undated) DEVICE — REM POLYHESIVE ADULT PATIENT RETURN ELECTRODE: Brand: VALLEYLAB

## (undated) DEVICE — SUTURE MCRYL SZ 4-0 L27IN ABSRB UD L19MM PS-2 1/2 CIR PRIM Y426H

## (undated) DEVICE — SYR 50ML LR LCK 1ML GRAD NSAF --

## (undated) DEVICE — DEVON™ KNEE AND BODY STRAP 60" X 3" (1.5 M X 7.6 CM): Brand: DEVON

## (undated) DEVICE — (D)PREP SKN CHLRAPRP APPL 26ML -- CONVERT TO ITEM 371833

## (undated) DEVICE — DERMABOND SKIN ADH 0.7ML -- DERMABOND ADVANCED 12/BX

## (undated) DEVICE — SEAL UNIV 5-8MM DISP BX/10 -- DA VINCI XI - SNGL USE

## (undated) DEVICE — GLOVE SURG SZ 75 L1212IN FNGR THK138MIL BRN LTX FREE

## (undated) DEVICE — 40418 TRENDELENBURG ONE-STEP ARM PROTECTORS LARGE (1 PAIR): Brand: 40418 TRENDELENBURG ONE-STEP ARM PROTECTORS LARGE (1 PAIR)

## (undated) DEVICE — CATH FOL TY IC BAG 16FR 2000ML -- CONVERT TO ITEM 363158

## (undated) DEVICE — TIP COVER ACCESSORY

## (undated) DEVICE — VISUALIZATION SYSTEM: Brand: CLEARIFY

## (undated) DEVICE — BASIC PACK: Brand: CONVERTORS

## (undated) DEVICE — 40580 - THE PINK PAD - ADVANCED TRENDELENBURG POSITIONING KIT: Brand: 40580 - THE PINK PAD - ADVANCED TRENDELENBURG POSITIONING KIT

## (undated) DEVICE — PAD SANIT NPKN 4IN GRD

## (undated) DEVICE — ELECTRO LUBE IS A SINGLE PATIENT USE DEVICE THAT IS INTENDED TO BE USED ON ELECTROSURGICAL ELECTRODES TO REDUCE STICKING.: Brand: KEY SURGICAL ELECTRO LUBE

## (undated) DEVICE — DRAPE,REIN 53X77,STERILE: Brand: MEDLINE